# Patient Record
Sex: FEMALE | Race: WHITE | Employment: FULL TIME | URBAN - NONMETROPOLITAN AREA
[De-identification: names, ages, dates, MRNs, and addresses within clinical notes are randomized per-mention and may not be internally consistent; named-entity substitution may affect disease eponyms.]

---

## 2019-05-21 ENCOUNTER — PROCEDURE VISIT (OUTPATIENT)
Dept: NEUROLOGY | Age: 39
End: 2019-05-21
Payer: COMMERCIAL

## 2019-05-21 DIAGNOSIS — R20.0 BILATERAL HAND NUMBNESS: ICD-10-CM

## 2019-05-21 DIAGNOSIS — G56.03 BILATERAL CARPAL TUNNEL SYNDROME: Primary | ICD-10-CM

## 2019-05-21 PROCEDURE — 95886 MUSC TEST DONE W/N TEST COMP: CPT | Performed by: PSYCHIATRY & NEUROLOGY

## 2019-05-21 PROCEDURE — 95911 NRV CNDJ TEST 9-10 STUDIES: CPT | Performed by: PSYCHIATRY & NEUROLOGY

## 2020-11-24 ENCOUNTER — OFFICE VISIT (OUTPATIENT)
Dept: INTERNAL MEDICINE CLINIC | Age: 40
End: 2020-11-24
Payer: COMMERCIAL

## 2020-11-24 VITALS
SYSTOLIC BLOOD PRESSURE: 139 MMHG | DIASTOLIC BLOOD PRESSURE: 84 MMHG | BODY MASS INDEX: 21.5 KG/M2 | HEIGHT: 67 IN | HEART RATE: 83 BPM | WEIGHT: 137 LBS | TEMPERATURE: 97.8 F

## 2020-11-24 LAB
ALCOHOL URINE: ABNORMAL
AMPHETAMINE SCREEN, URINE: ABNORMAL
BARBITURATE SCREEN, URINE: ABNORMAL
BENZODIAZEPINE SCREEN, URINE: ABNORMAL
BUPRENORPHINE URINE: ABNORMAL
COCAINE METABOLITE SCREEN URINE: ABNORMAL
FENTANYL SCREEN, URINE: ABNORMAL
GABAPENTIN SCREEN, URINE: ABNORMAL
MDMA URINE: ABNORMAL
METHADONE SCREEN, URINE: ABNORMAL
METHAMPHETAMINE, URINE: ABNORMAL
OPIATE SCREEN URINE: ABNORMAL
OXYCODONE SCREEN URINE: ABNORMAL
PHENCYCLIDINE SCREEN URINE: ABNORMAL
PROPOXYPHENE SCREEN, URINE: ABNORMAL
SYNTHETIC CANNABINOIDS(K2) SCREEN, URINE: ABNORMAL
THC SCREEN, URINE: ABNORMAL
TRAMADOL SCREEN URINE: ABNORMAL
TRICYCLIC ANTIDEPRESSANTS, UR: ABNORMAL

## 2020-11-24 PROCEDURE — 99204 OFFICE O/P NEW MOD 45 MIN: CPT | Performed by: INTERNAL MEDICINE

## 2020-11-24 PROCEDURE — 80305 DRUG TEST PRSMV DIR OPT OBS: CPT | Performed by: INTERNAL MEDICINE

## 2020-11-24 RX ORDER — CITALOPRAM HYDROBROMIDE 20 MG/10ML
10 SOLUTION, ORAL ORAL DAILY
COMMUNITY
End: 2020-12-28

## 2020-11-24 RX ORDER — BUPRENORPHINE AND NALOXONE 12; 3 MG/1; MG/1
1 FILM, SOLUBLE BUCCAL; SUBLINGUAL DAILY
Qty: 4 FILM | Refills: 0 | Status: SHIPPED | OUTPATIENT
Start: 2020-11-24 | End: 2020-11-28

## 2020-11-24 SDOH — HEALTH STABILITY: MENTAL HEALTH: HOW OFTEN DO YOU HAVE A DRINK CONTAINING ALCOHOL?: MONTHLY OR LESS

## 2020-11-24 NOTE — PROGRESS NOTES
Verbal order per Dr. Lola Duarte for urine drug screen. Positive for BUP THC Alcohol. Verified results with Demi Raymundo RN. Dr. Lola Duarte ordered Suboxone 12mg film daily  for patient. Verified dose with patient. Patient was sent home with 4 day script of Suboxone 12mg film daily and will be seen back in the office 12/2/20.

## 2020-11-24 NOTE — PROGRESS NOTES
MEDICATION ASSISTED TREATMENT ENCOUNTER    HISTORY OF PRESENT ILLNESS  Patient presents for evaluation of opioid use and would like to be placed on medication assisted treatment  Patient was referred by a friend  She is on a Suboxone clinic on the other side of [place] has to pay $200 every 4 weeks  Patient has had problems using pain pills  Patient started using pain pills 10 years ago  A friend had given her a pain pill and she said it gave her energy  Patient uses it she usually would swallow them but at the end she was snorting them  Other drugs used: No  Suboxone programs in the past med to order in [address] [place] [address] in the past no  Last use of pain pills 7 years ago  Patient would typically use toward the end of a year she was using 10/5 to 10 mg Percocets  Ever used Suboxone off the street yes right before she quit pain pills she had used off the street and knew she wanted to try it    No past medical history on file. No past surgical history on file. PAST PSYCHIATRIC HISTORY: no    No Known Allergies    Current Outpatient Medications   Medication Sig Dispense Refill    citalopram (CELEXA) 10 MG/5ML solution Take 10 mg by mouth daily      buprenorphine-naloxone (SUBOXONE) 12-3 MG sublingual film Place 1 Film under the tongue daily for 4 days. 4 Film 0     No current facility-administered medications for this visit.           SOCIAL     Marital status      Children 2     Employment she works at [employer] issues no     Tobacco: yes, cigarettes     Alcohol occasional                 ROS     General: Patient denies fevers, chills ,weight changes, sweats     Psych: No depression, anxiety, suicidal ideation or attempts     Endocrine: No thyroid issues,no neck pain, no galactorrhea, no weight changes     Pulmonary: No shortness of breath, orthopnea, PND     Cardiac: No chest pain,syncope, no history of 11/24/2020  8:10 AM  Unknown    NEG    Oxycodone Screen, Ur  11/24/2020  8:10 AM  Unknown    NEG    PCP Screen, Urine  11/24/2020  8:10 AM  Unknown    NEG    Propoxyphene Screen, Urine  11/24/2020  8:10 AM  Unknown    N/A    Synthetic Cannabinoids (K2) Screen, Urine  11/24/2020  8:10 AM  Unknown    NEG    THC Screen, Urine  11/24/2020  8:10 AM  Unknown    POS    Tramadol Scrn, Ur  11/24/2020  8:10 AM  Unknown    NEG    Tricyclic Antidepressants, Urine  11/24/2020  8:10 AM  Unknown    N/A           Diagnosis Orders   1. Severe opioid use disorder (HCC)  POCT Rapid Drug Screen    buprenorphine-naloxone (SUBOXONE) 12-3 MG sublingual film   2. Encounter for monitoring Suboxone maintenance therapy     3. Polysubstance abuse [facility])           PLAN:  Provider provided education on Medication Assisted Treatment options, including: Suboxone, Sublocade, Methadone, and Naltrexone/Vivitrol  Allowed opportunity to respond to questions regarding the treatment options. Patient voices that their treatment preference is suboxone. I feel that this patient is an appropriate candidate for this treatment option. Education given on the importance of combining Medication Assisted Treatment with comprehensive treatment, including: individual counseling, treatment groups, community support groups, and psychiatry as applicable. Patient will meet with  to review clinic counseling expectations and to be linked to appropriate services. Provider reviewed medication contract with patient. Patient is agreeable to the program expectations. Both patient and provider signed the medication contract. Patient instructed to go to [facility] to watch a video and learn about [facility]. I told patient [facility] is an opioid antagonist that reverses respiratory depression caused by opioids. Pharmacy will give patient or family member [facility] and explain how to use in an emergency.   I reviewed the [place] Automated Rx Reporting System report     There does not appear to be any discrepancies or overprescribing of controlled substances    She said she eventually would like to try to wean off of Suboxone she has been on it so long  We will bring her down from 8 mg to 6 mg daily  Follow-up 8 days

## 2020-12-02 ENCOUNTER — OFFICE VISIT (OUTPATIENT)
Dept: INTERNAL MEDICINE CLINIC | Age: 40
End: 2020-12-02
Payer: COMMERCIAL

## 2020-12-02 VITALS
DIASTOLIC BLOOD PRESSURE: 60 MMHG | TEMPERATURE: 98.2 F | WEIGHT: 140 LBS | HEART RATE: 80 BPM | HEIGHT: 67 IN | SYSTOLIC BLOOD PRESSURE: 118 MMHG | BODY MASS INDEX: 21.97 KG/M2

## 2020-12-02 PROCEDURE — 80305 DRUG TEST PRSMV DIR OPT OBS: CPT | Performed by: INTERNAL MEDICINE

## 2020-12-02 PROCEDURE — 99213 OFFICE O/P EST LOW 20 MIN: CPT | Performed by: INTERNAL MEDICINE

## 2020-12-02 RX ORDER — ESCITALOPRAM OXALATE 20 MG/1
TABLET ORAL
COMMUNITY
Start: 2020-11-25

## 2020-12-02 RX ORDER — BUPRENORPHINE AND NALOXONE 4; 1 MG/1; MG/1
1 FILM, SOLUBLE BUCCAL; SUBLINGUAL DAILY
Qty: 5 FILM | Refills: 0 | Status: SHIPPED | OUTPATIENT
Start: 2020-12-02 | End: 2020-12-09 | Stop reason: SDUPTHER

## 2020-12-02 RX ORDER — BUPRENORPHINE AND NALOXONE 8; 2 MG/1; MG/1
FILM, SOLUBLE BUCCAL; SUBLINGUAL
COMMUNITY
Start: 2020-10-28 | End: 2020-12-09

## 2020-12-02 RX ORDER — BUPRENORPHINE AND NALOXONE 8; 2 MG/1; MG/1
1 FILM, SOLUBLE BUCCAL; SUBLINGUAL DAILY
Qty: 7 FILM | Refills: 0 | Status: CANCELLED | OUTPATIENT
Start: 2020-12-02 | End: 2020-12-09

## 2020-12-02 NOTE — PROGRESS NOTES
12/02/2020  8:00 AM  Unknown    NEG    Amphetamine Screen, Urine  12/02/2020  8:00 AM  Unknown    NEG    Barbiturate Screen, Urine  12/02/2020  8:00 AM  Unknown    NEG    Benzodiazepine Screen, Urine  12/02/2020  8:00 AM  Unknown    NEG    Buprenorphine Urine  12/02/2020  8:00 AM  Unknown    POS    Cocaine Metabolite Screen, Urine  12/02/2020  8:00 AM  Unknown    NEG    FENTANYL SCREEN, URINE  12/02/2020  8:00 AM  Unknown    NEG    Gabapentin Screen, Urine  12/02/2020  8:00 AM  Unknown    N/A    MDMA, Urine  12/02/2020  8:00 AM  Unknown    NEG    Methadone Screen, Urine  12/02/2020  8:00 AM  Unknown    NEG    Methamphetamine, Urine  12/02/2020  8:00 AM  Unknown    NEG    Opiate Scrn, Ur  12/02/2020  8:00 AM  Unknown    NEG    Oxycodone Screen, Ur  12/02/2020  8:00 AM  Unknown    NEG    PCP Screen, Urine  12/02/2020  8:00 AM  Unknown    NEG    Propoxyphene Screen, Urine  12/02/2020  8:00 AM  Unknown    N/A    Synthetic Cannabinoids (K2) Screen, Urine  12/02/2020  8:00 AM  Unknown    NEG    THC Screen, Urine  12/02/2020  8:00 AM  Unknown    POS    Tramadol Scrn, Ur  12/02/2020  8:00 AM  Unknown    NEG    Tricyclic Antidepressants, Urine  12/02/2020  8:00 AM  Unknown    N/A           Diagnosis Orders   1. Severe opioid use disorder (HCC)  POCT Rapid Drug Screen    buprenorphine-naloxone (SUBOXONE) 4-1 MG FILM SL film   2. Encounter for monitoring Suboxone maintenance therapy     3.  Polysubstance abuse ([facility] [address]           PLAN:  I told her the optimal dose of Suboxone is one in which she is having no withdrawal, no urges and cravings, no side effects from Suboxone  It sounds like she is having side effects  She has been taking a third of a 12 mg strip  I will a put her on 4 mg films daily  See her back in 1 week

## 2020-12-09 ENCOUNTER — OFFICE VISIT (OUTPATIENT)
Dept: INTERNAL MEDICINE CLINIC | Age: 40
End: 2020-12-09
Payer: COMMERCIAL

## 2020-12-09 VITALS
DIASTOLIC BLOOD PRESSURE: 65 MMHG | WEIGHT: 138 LBS | SYSTOLIC BLOOD PRESSURE: 134 MMHG | HEART RATE: 100 BPM | TEMPERATURE: 98.2 F | BODY MASS INDEX: 21.66 KG/M2 | HEIGHT: 67 IN

## 2020-12-09 PROCEDURE — 80305 DRUG TEST PRSMV DIR OPT OBS: CPT | Performed by: INTERNAL MEDICINE

## 2020-12-09 PROCEDURE — 99213 OFFICE O/P EST LOW 20 MIN: CPT | Performed by: INTERNAL MEDICINE

## 2020-12-09 RX ORDER — BUPRENORPHINE AND NALOXONE 4; 1 MG/1; MG/1
1 FILM, SOLUBLE BUCCAL; SUBLINGUAL DAILY
Qty: 13 FILM | Refills: 0 | Status: SHIPPED | OUTPATIENT
Start: 2020-12-09 | End: 2020-12-22

## 2020-12-09 NOTE — PROGRESS NOTES
MEDICATION ASSISTED TREATMENT ENCOUNTER    HISTORY OF PRESENT ILLNESS  Patient presents for evaluation of opioid use and would like to be placed on medication assisted treatment  I last saw her 12/2  I put her on Suboxone 12 mg daily  She said for the first few days she had a headache her eyes were puffy she did take some for a couple of days now she is taking a third of a 12 mg strip    Patient was referred by a friend  She is on a Suboxone clinic on the other side [facility] has to pay $200 every 4 weeks  Patient has had problems using pain pills  Patient started using pain pills 10 years ago  A friend had given her a pain pill and she said it gave her energy  Patient uses it she usually would swallow them but at the end she was snorting them  Other drugs used: No                ROS     General: As above was having headaches very tired feeling better in the last couple of days  PHYSICAL EXAM     Blood pressure 134/65, pulse 100, temperature 98.2 °F (36.8 °C), height 5' 7\" (1.702 m), weight 138 lb (62.6 kg).              General: Patient resting comfortably in no acute distress     Mental Status Examination:  Level of consciousness:  within normal limits and awake  Appearance:  well-appearing, in chair, good grooming and good hygiene  Behavior/Motor:  {Normal  Attitude toward examiner:  cooperative and attentive  Speech:  spontaneous and normal volume  Mood: normal  Affect:  appropriate  Thought processes:  linear  Thought content:  Denies homicidal ideations  Suicidal Ideation:  denies suicidal ideation  Delusions:  no evidence of delusions  Perceptual Disturbance:  denies any perceptual disturbance  Cognition:  oriented to person, place, and time    Insight : good  Judgment: good  Medication Side Effects:none       Eyes: Pupils are normal         Skin: No rashes, lesions or abnormalities noted        URINE DRUG SCREEN TODAY:  Alcohol, Urine

## 2020-12-23 RX ORDER — BUPRENORPHINE AND NALOXONE 4; 1 MG/1; MG/1
1 FILM, SOLUBLE BUCCAL; SUBLINGUAL DAILY
Qty: 6 FILM | Refills: 0 | OUTPATIENT
Start: 2020-12-23 | End: 2020-12-28 | Stop reason: SDUPTHER

## 2020-12-23 NOTE — TELEPHONE ENCOUNTER
VO per Dr. Vicenta Gallegos for Suboxone 4mg film daily for 6 days qty 6. LPN called in script of Suboxone 4mg film daily for 6 days qty 6 into [facility] in [facility].

## 2020-12-28 ENCOUNTER — OFFICE VISIT (OUTPATIENT)
Dept: INTERNAL MEDICINE CLINIC | Age: 40
End: 2020-12-28
Payer: COMMERCIAL

## 2020-12-28 VITALS
BODY MASS INDEX: 21.82 KG/M2 | SYSTOLIC BLOOD PRESSURE: 129 MMHG | DIASTOLIC BLOOD PRESSURE: 75 MMHG | HEART RATE: 94 BPM | WEIGHT: 139 LBS | HEIGHT: 67 IN | TEMPERATURE: 97.9 F

## 2020-12-28 PROCEDURE — 80305 DRUG TEST PRSMV DIR OPT OBS: CPT | Performed by: INTERNAL MEDICINE

## 2020-12-28 PROCEDURE — 99213 OFFICE O/P EST LOW 20 MIN: CPT | Performed by: INTERNAL MEDICINE

## 2020-12-28 RX ORDER — BUPRENORPHINE AND NALOXONE 4; 1 MG/1; MG/1
1 FILM, SOLUBLE BUCCAL; SUBLINGUAL DAILY
Qty: 7 FILM | Refills: 0 | Status: SHIPPED | OUTPATIENT
Start: 2020-12-28 | End: 2021-01-04 | Stop reason: SDUPTHER

## 2020-12-28 NOTE — PROGRESS NOTES
MEDICATION ASSISTED TREATMENT ENCOUNTER    HISTORY OF PRESENT ILLNESS  Patient presents for evaluation of opioid use and would like to be placed on medication assisted treatment  I last saw her 12/9  I put her on Suboxone 12 mg daily  She said for the first few days she had a headache her eyes were puffy she did take some for a couple of days now she is taking a third of a 12 mg strip    Patient was referred by a friend  She is on a Suboxone clinic on the other side of [place] has to pay $200 every 4 weeks  Patient has had problems using pain pills  Patient started using pain pills 10 years ago  A friend had given her a pain pill and she said it gave her energy  Patient uses it she usually would swallow them but at the end she was snorting them  Other drugs used: No                ROS     General:Patient is feeling well  Patient is not experiencing  withdrawal symptoms ,no urges or cravings  Patient is not having any side effects from the buprenorphine    PHYSICAL EXAM     Blood pressure 129/75, pulse 94, temperature 97.9 °F (36.6 °C), height 5' 7\" (1.702 m), weight 139 lb (63 kg).              General: Patient resting comfortably in no acute distress     Mental Status Examination:  Level of consciousness:  within normal limits and awake  Appearance:  well-appearing, in chair, good grooming and good hygiene  Behavior/Motor:  {Normal  Attitude toward examiner:  cooperative and attentive  Speech:  spontaneous and normal volume  Mood: normal  Affect:  appropriate  Thought processes:  linear  Thought content:  Denies homicidal ideations  Suicidal Ideation:  denies suicidal ideation  Delusions:  no evidence of delusions  Perceptual Disturbance:  denies any perceptual disturbance  Cognition:  oriented to person, place, and time    Insight : good  Judgment: good  Medication Side Effects:none       Eyes: Pupils are normal         Skin: No rashes, lesions or abnormalities noted        URINE DRUG SCREEN TODAY:  Alcohol, Urine 12/28/2020 11:10 AM Unknown   NEG    Amphetamine Screen, Urine 12/28/2020 11:10 AM Unknown   NEG    Barbiturate Screen, Urine 12/28/2020 11:10 AM Unknown   NEG    Benzodiazepine Screen, Urine 12/28/2020 11:10 AM Unknown   NEG    Buprenorphine Urine 12/28/2020 11:10 AM Unknown   POS    Cocaine Metabolite Screen, Urine 12/28/2020 11:10 AM Unknown   NEG    FENTANYL SCREEN, URINE 12/28/2020 11:10 AM Unknown   NEG    Gabapentin Screen, Urine 12/28/2020 11:10 AM Unknown   N/A    MDMA, Urine 12/28/2020 11:10 AM Unknown   NEG    Methadone Screen, Urine 12/28/2020 11:10 AM Unknown   NEG    Methamphetamine, Urine 12/28/2020 11:10 AM Unknown   NEG    Opiate Scrn, Ur 12/28/2020 11:10 AM Unknown   NEG    Oxycodone Screen, Ur 12/28/2020 11:10 AM Unknown   NEG    PCP Screen, Urine 12/28/2020 11:10 AM Unknown   NEG    Propoxyphene Screen, Urine 12/28/2020 11:10 AM Unknown   N/A    Synthetic Cannabinoids (K2) Screen, Urine 12/28/2020 11:10 AM Unknown   NEG    THC Screen, Urine 12/28/2020 11:10 AM Unknown   POS    Tramadol Scrn, Ur 12/28/2020 11:10 AM Unknown   NEG    Tricyclic Antidepressants, Urine 12/28/2020 11:10 AM Unknown   N/A         Diagnosis Orders   1. Severe opioid use disorder (HCC)  POCT Rapid Drug Screen    buprenorphine-naloxone (SUBOXONE) 4-1 MG FILM SL film   2. Encounter for monitoring Suboxone maintenance therapy     3.  Polysubstance abuse ([facility] [address]            PLAN:  Patient is on 4 mg Suboxone daily and doing well  I reviewed the [place] Automated Rx Reporting System report     There does not appear to be any discrepancies or overprescribing of controlled substances  Follow-up 1 week

## 2021-01-04 ENCOUNTER — OFFICE VISIT (OUTPATIENT)
Dept: INTERNAL MEDICINE CLINIC | Age: 41
End: 2021-01-04
Payer: COMMERCIAL

## 2021-01-04 VITALS
HEIGHT: 67 IN | DIASTOLIC BLOOD PRESSURE: 60 MMHG | HEART RATE: 88 BPM | SYSTOLIC BLOOD PRESSURE: 120 MMHG | WEIGHT: 142 LBS | BODY MASS INDEX: 22.29 KG/M2 | TEMPERATURE: 97.5 F

## 2021-01-04 DIAGNOSIS — Z51.81 ENCOUNTER FOR MONITORING SUBOXONE MAINTENANCE THERAPY: ICD-10-CM

## 2021-01-04 DIAGNOSIS — Z79.899 ENCOUNTER FOR MONITORING SUBOXONE MAINTENANCE THERAPY: ICD-10-CM

## 2021-01-04 DIAGNOSIS — F11.20 SEVERE OPIOID USE DISORDER (HCC): Primary | ICD-10-CM

## 2021-01-04 DIAGNOSIS — F19.10 POLYSUBSTANCE ABUSE (HCC): ICD-10-CM

## 2021-01-04 PROCEDURE — 80305 DRUG TEST PRSMV DIR OPT OBS: CPT | Performed by: INTERNAL MEDICINE

## 2021-01-04 PROCEDURE — 99213 OFFICE O/P EST LOW 20 MIN: CPT | Performed by: INTERNAL MEDICINE

## 2021-01-04 RX ORDER — BUPRENORPHINE AND NALOXONE 4; 1 MG/1; MG/1
1 FILM, SOLUBLE BUCCAL; SUBLINGUAL DAILY
Qty: 14 FILM | Refills: 0 | Status: SHIPPED | OUTPATIENT
Start: 2021-01-04 | End: 2021-01-18

## 2021-01-04 ASSESSMENT — ENCOUNTER SYMPTOMS: EYES NEGATIVE: 1

## 2021-01-04 NOTE — PROGRESS NOTES
Verbal order per Dr. Lakshmi Valdez for urine drug screen. Positive for BUP Alcohol. Verified results with Anu Chauhan RN. Dr. Lakshmi Valdez ordered Suboxone 4mg film daily for patient. Verified dose with patient. Patient was sent home with 2 week script of Suboxone 4mg film daily and will be seen back in the office 1/19/21.

## 2021-01-04 NOTE — PROGRESS NOTES
Vi Gallagher (:  1980) is a 38 y.o. female,Established patient, here for evaluation of the following chief complaint(s):  Drug Problem      ASSESSMENT/PLAN:  1. Severe opioid use disorder (HCC)  -     POCT Rapid Drug Screen  -     buprenorphine-naloxone (SUBOXONE) 4-1 MG FILM SL film; Place 1 Film under the tongue daily for 14 days. , Disp-14 Film, R-0Normal  2. Encounter for monitoring Suboxone maintenance therapy  3. Polysubstance abuse ([address]      Return in about 2 weeks (around 2021). SUBJECTIVE/OBJECTIVE:  HPI  I last saw her   I put her on Suboxone 12 mg daily    She said for the first few days she had a headache her eyes were puffy she did take some for a couple of days now she is taking a third of a 12 mg strip    Patient was referred by a friend  She is on a Suboxone clinic on the other side [facility] has to pay $200 every 4 weeks  Patient has had problems using pain pills  Patient started using pain pills 10 years ago  A friend had given her a pain pill and she said it gave her energy  Patient uses it she usually would swallow them but at the end she was snorting them  Other drugs used: No  Review of Systems   Constitutional: Negative. Eyes: Negative. Neurological: Negative. Physical Exam  Constitutional:       Appearance: She is normal weight. Eyes:      Extraocular Movements: Extraocular movements intact. Pupils: Pupils are equal, round, and reactive to light. Skin:     General: Skin is warm. Neurological:      General: No focal deficit present. Mental Status: She is alert.          Urine drug screen today  Alcohol, Urine 2021  1:11 PM Unknown   POS    Amphetamine Screen, Urine 2021  1:11 PM Unknown   NEG    Barbiturate Screen, Urine 2021  1:11 PM Unknown   NEG    Benzodiazepine Screen, Urine 2021  1:11 PM Unknown   NEG    Buprenorphine Urine 2021  1:11 PM Unknown   POS    Cocaine Metabolite Screen, Urine 2021  1:11

## 2021-01-19 ENCOUNTER — OFFICE VISIT (OUTPATIENT)
Dept: INTERNAL MEDICINE CLINIC | Age: 41
End: 2021-01-19
Payer: COMMERCIAL

## 2021-01-19 VITALS
WEIGHT: 142 LBS | SYSTOLIC BLOOD PRESSURE: 121 MMHG | BODY MASS INDEX: 22.29 KG/M2 | HEIGHT: 67 IN | DIASTOLIC BLOOD PRESSURE: 85 MMHG | HEART RATE: 85 BPM

## 2021-01-19 DIAGNOSIS — F11.20 SEVERE OPIOID USE DISORDER (HCC): Primary | ICD-10-CM

## 2021-01-19 DIAGNOSIS — Z79.899 ENCOUNTER FOR MONITORING SUBOXONE MAINTENANCE THERAPY: ICD-10-CM

## 2021-01-19 DIAGNOSIS — Z51.81 ENCOUNTER FOR MONITORING SUBOXONE MAINTENANCE THERAPY: ICD-10-CM

## 2021-01-19 DIAGNOSIS — F19.10 POLYSUBSTANCE ABUSE (HCC): ICD-10-CM

## 2021-01-19 PROCEDURE — 80305 DRUG TEST PRSMV DIR OPT OBS: CPT | Performed by: INTERNAL MEDICINE

## 2021-01-19 PROCEDURE — 99213 OFFICE O/P EST LOW 20 MIN: CPT | Performed by: INTERNAL MEDICINE

## 2021-01-19 RX ORDER — BUPRENORPHINE AND NALOXONE 2; .5 MG/1; MG/1
1 FILM, SOLUBLE BUCCAL; SUBLINGUAL DAILY
Qty: 7 FILM | Refills: 0 | Status: SHIPPED | OUTPATIENT
Start: 2021-01-19 | End: 2021-01-26 | Stop reason: SDUPTHER

## 2021-01-19 RX ORDER — BUPRENORPHINE AND NALOXONE 4; 1 MG/1; MG/1
1 FILM, SOLUBLE BUCCAL; SUBLINGUAL DAILY
COMMUNITY
End: 2021-07-14 | Stop reason: SDUPTHER

## 2021-01-19 RX ORDER — BUPRENORPHINE AND NALOXONE 4; 1 MG/1; MG/1
1 FILM, SOLUBLE BUCCAL; SUBLINGUAL DAILY
Qty: 7 FILM | Refills: 0 | Status: CANCELLED | OUTPATIENT
Start: 2021-01-19 | End: 2021-01-26

## 2021-01-19 ASSESSMENT — ENCOUNTER SYMPTOMS: EYES NEGATIVE: 1

## 2021-01-19 NOTE — PROGRESS NOTES
Verbal order per Dr. Jero Medrano for urine drug screen. Positive for BUP, ETOH, THC. Verified results with Elaine Gaytan LPN. Dr. Jero Medrano ordered Suboxone 2 mg film daily for patient. Verified dose with patient. Patient was sent home with 1 week script for Suboxone 2 mg film daily and will be seen back in the office on 1/26/21.

## 2021-01-19 NOTE — PROGRESS NOTES
Kenna Patel (:  1980) is a 38 y.o. female,Established patient, here for evaluation of the following chief complaint(s):  Drug Problem      ASSESSMENT/PLAN:  1. Severe opioid use disorder (HCC)  -     POCT Rapid Drug Screen  -     buprenorphine-naloxone (SUBOXONE) 2-0.5 MG FILM SL film; Place 1 Film under the tongue daily for 7 days. , Disp-7 Film, R-0Normal  2. Encounter for monitoring Suboxone maintenance therapy  3. Polysubstance abuse ([facility] [address]      I am going to lower her dose of Suboxone to 2 mg daily  Follow-up here in 1 week  She is not having any urges or cravings  SUBJECTIVE/OBJECTIVE:  HPI  I last saw her   I put her on Suboxone 12 mg daily    She said for the first few days she had a headache her eyes were puffy she did take some for a couple of days now she is taking a third of a 12 mg strip    Patient was referred by a friend  She is on a Suboxone clinic on the other side [facility] has to pay $200 every 4 weeks  Patient has had problems using pain pills  Patient started using pain pills 10 years ago  A friend had given her a pain pill and she said it gave her energy  Patient uses it she usually would swallow them but at the end she was snorting them  Other drugs used: No  She has been on Suboxone about 7 years is considering getting off of it  Review of Systems   Constitutional: Negative. Eyes: Negative. Neurological: Negative. Physical Exam  Constitutional:       Appearance: She is normal weight. Eyes:      Extraocular Movements: Extraocular movements intact. Pupils: Pupils are equal, round, and reactive to light. Skin:     General: Skin is warm. Neurological:      General: No focal deficit present. Mental Status: She is alert.          Urine drug screen today  Alcohol, Urine 2021  8:14 AM Unknown   POS    Amphetamine Screen, Urine 2021  8:14 AM Unknown   NEG    Barbiturate Screen, Urine 2021  8:14 AM Unknown   NEG    Benzodiazepine Screen, Urine 01/19/2021  8:14 AM Unknown   NEG    Buprenorphine Urine 01/19/2021  8:14 AM Unknown   POS    Cocaine Metabolite Screen, Urine 01/19/2021  8:14 AM Unknown   NEG    FENTANYL SCREEN, URINE 01/19/2021  8:14 AM Unknown   NEG    Gabapentin Screen, Urine 01/19/2021  8:14 AM Unknown   N/A    MDMA, Urine 01/19/2021  8:14 AM Unknown   NEG    Methadone Screen, Urine 01/19/2021  8:14 AM Unknown   NEG    Methamphetamine, Urine 01/19/2021  8:14 AM Unknown   NEG    Opiate Scrn, Ur 01/19/2021  8:14 AM Unknown   NEG    Oxycodone Screen, Ur 01/19/2021  8:14 AM Unknown   NEG    PCP Screen, Urine 01/19/2021  8:14 AM Unknown   NEG    Propoxyphene Screen, Urine 01/19/2021  8:14 AM Unknown   N/A    Synthetic Cannabinoids (K2) Screen, Urine 01/19/2021  8:14 AM Unknown   NEG    THC Screen, Urine 01/19/2021  8:14 AM Unknown   POS    Tramadol Scrn, Ur 01/19/2021  8:14 AM Unknown   NEG    Tricyclic Antidepressants, Urine 01/19/2021  8:14 AM Unknown   N/A          I reviewed the [place] Automated Rx Reporting System report     There does not appear to be any discrepancies or overprescribing of controlled substances        An electronic signature was used to authenticate this note.     --Kenia Herrera MD

## 2021-01-26 ENCOUNTER — OFFICE VISIT (OUTPATIENT)
Dept: INTERNAL MEDICINE CLINIC | Age: 41
End: 2021-01-26
Payer: COMMERCIAL

## 2021-01-26 ENCOUNTER — TELEPHONE (OUTPATIENT)
Dept: INTERNAL MEDICINE CLINIC | Age: 41
End: 2021-01-26

## 2021-01-26 ENCOUNTER — VIRTUAL VISIT (OUTPATIENT)
Dept: INTERNAL MEDICINE CLINIC | Age: 41
End: 2021-01-26
Payer: COMMERCIAL

## 2021-01-26 VITALS
BODY MASS INDEX: 21.82 KG/M2 | SYSTOLIC BLOOD PRESSURE: 94 MMHG | HEART RATE: 91 BPM | DIASTOLIC BLOOD PRESSURE: 60 MMHG | WEIGHT: 139 LBS | HEIGHT: 67 IN

## 2021-01-26 DIAGNOSIS — F11.20 SEVERE OPIOID USE DISORDER (HCC): ICD-10-CM

## 2021-01-26 DIAGNOSIS — F19.10 POLYSUBSTANCE ABUSE (HCC): ICD-10-CM

## 2021-01-26 DIAGNOSIS — Z79.899 ENCOUNTER FOR MONITORING SUBOXONE MAINTENANCE THERAPY: ICD-10-CM

## 2021-01-26 DIAGNOSIS — Z78.9 ALCOHOL USE: ICD-10-CM

## 2021-01-26 DIAGNOSIS — Z51.81 ENCOUNTER FOR MONITORING SUBOXONE MAINTENANCE THERAPY: ICD-10-CM

## 2021-01-26 DIAGNOSIS — F11.20 SEVERE OPIOID USE DISORDER (HCC): Primary | ICD-10-CM

## 2021-01-26 PROCEDURE — 99213 OFFICE O/P EST LOW 20 MIN: CPT | Performed by: INTERNAL MEDICINE

## 2021-01-26 PROCEDURE — 99024 POSTOP FOLLOW-UP VISIT: CPT | Performed by: INTERNAL MEDICINE

## 2021-01-26 PROCEDURE — 80305 DRUG TEST PRSMV DIR OPT OBS: CPT | Performed by: INTERNAL MEDICINE

## 2021-01-26 RX ORDER — BUPRENORPHINE AND NALOXONE 2; .5 MG/1; MG/1
1 FILM, SOLUBLE BUCCAL; SUBLINGUAL DAILY
Qty: 14 FILM | Refills: 0 | Status: SHIPPED | OUTPATIENT
Start: 2021-01-26 | End: 2021-02-09

## 2021-01-26 RX ORDER — BUPRENORPHINE AND NALOXONE 2; .5 MG/1; MG/1
1 FILM, SOLUBLE BUCCAL; SUBLINGUAL DAILY
Qty: 14 FILM | Refills: 0 | Status: CANCELLED | OUTPATIENT
Start: 2021-01-26 | End: 2021-02-09

## 2021-01-26 ASSESSMENT — ENCOUNTER SYMPTOMS: EYES NEGATIVE: 1

## 2021-01-26 NOTE — TELEPHONE ENCOUNTER
[employer] # [phone] requesting to talk to Dr. Solange Jacob nurse about patient's RX. I gave request to Dr. Simran Teresa.

## 2021-02-10 ENCOUNTER — OFFICE VISIT (OUTPATIENT)
Dept: INTERNAL MEDICINE CLINIC | Age: 41
End: 2021-02-10
Payer: COMMERCIAL

## 2021-02-10 VITALS
BODY MASS INDEX: 21.82 KG/M2 | SYSTOLIC BLOOD PRESSURE: 129 MMHG | HEIGHT: 67 IN | TEMPERATURE: 97.5 F | WEIGHT: 139 LBS | DIASTOLIC BLOOD PRESSURE: 85 MMHG | HEART RATE: 85 BPM

## 2021-02-10 DIAGNOSIS — F11.20 SEVERE OPIOID USE DISORDER (HCC): Primary | ICD-10-CM

## 2021-02-10 DIAGNOSIS — Z79.899 ENCOUNTER FOR MONITORING SUBOXONE MAINTENANCE THERAPY: ICD-10-CM

## 2021-02-10 DIAGNOSIS — F19.10 POLYSUBSTANCE ABUSE (HCC): ICD-10-CM

## 2021-02-10 DIAGNOSIS — Z78.9 ALCOHOL USE: ICD-10-CM

## 2021-02-10 DIAGNOSIS — Z51.81 ENCOUNTER FOR MONITORING SUBOXONE MAINTENANCE THERAPY: ICD-10-CM

## 2021-02-10 PROCEDURE — 80305 DRUG TEST PRSMV DIR OPT OBS: CPT | Performed by: INTERNAL MEDICINE

## 2021-02-10 PROCEDURE — 99213 OFFICE O/P EST LOW 20 MIN: CPT | Performed by: INTERNAL MEDICINE

## 2021-02-10 RX ORDER — BUPRENORPHINE AND NALOXONE 2; .5 MG/1; MG/1
1 FILM, SOLUBLE BUCCAL; SUBLINGUAL
Qty: 7 FILM | Refills: 0 | Status: SHIPPED | OUTPATIENT
Start: 2021-02-10 | End: 2021-02-17

## 2021-02-10 RX ORDER — BUPRENORPHINE AND NALOXONE 2; .5 MG/1; MG/1
1 FILM, SOLUBLE BUCCAL; SUBLINGUAL DAILY
Qty: 14 FILM | Refills: 0 | Status: CANCELLED | OUTPATIENT
Start: 2021-02-10 | End: 2021-02-24

## 2021-02-10 ASSESSMENT — ENCOUNTER SYMPTOMS: EYES NEGATIVE: 1

## 2021-02-10 NOTE — PROGRESS NOTES
Eloina Bates (:  1980) is a 38 y.o. female,Established patient, here for evaluation of the following chief complaint(s):  Drug Problem      ASSESSMENT/PLAN:  1. Severe opioid use disorder (HCC)  -     POCT Rapid Drug Screen  -     buprenorphine-naloxone (SUBOXONE) 2-0.5 MG FILM SL film; Place 1 Film under the tongue every 48 hours for 7 days. , Disp-7 Film, R-0Normal  2. Encounter for monitoring Suboxone maintenance therapy  3. Polysubstance abuse ([facility] [address]  4. Alcohol use    Plan will be to have her take Suboxone 2 mg every other day  We will do a virtual visit in 1 week  Patient is sure that she wants to get off of Suboxone  SUBJECTIVE/OBJECTIVE:  Drug Problem      I last saw her   She is been on Suboxone for about 8 years  We are weaning her down  She said she is taking 2 mg a day and doing well      Patient was referred by a friend  She is on a Suboxone clinic on the other side [facility] has to pay $200 every 4 weeks  Patient has had problems using pain pills  Patient started using pain pills 10 years ago  A friend had given her a pain pill and she said it gave her energy  Patient uses it she usually would swallow them but at the end she was snorting them  Other drugs used: No    Review of Systems   Constitutional: Negative. Eyes: Negative. Neurological: Negative. Physical Exam  Constitutional:       Appearance: She is normal weight. Eyes:      Extraocular Movements: Extraocular movements intact. Pupils: Pupils are equal, round, and reactive to light. Skin:     General: Skin is warm. Neurological:      General: No focal deficit present. Mental Status: She is alert.          Urine drug screen today  Alcohol, Urine 02/10/2021  8:15 AM Unknown   POS    Amphetamine Screen, Urine 02/10/2021  8:15 AM Unknown   NEG    Barbiturate Screen, Urine 02/10/2021  8:15 AM Unknown   NEG    Benzodiazepine Screen, Urine 02/10/2021  8:15 AM Unknown   NEG    Buprenorphine Urine 02/10/2021  8:15 AM Unknown   POS    Cocaine Metabolite Screen, Urine 02/10/2021  8:15 AM Unknown   NEG    FENTANYL SCREEN, URINE 02/10/2021  8:15 AM Unknown   NEG    Gabapentin Screen, Urine 02/10/2021  8:15 AM Unknown   N/A    MDMA, Urine 02/10/2021  8:15 AM Unknown   NEG    Methadone Screen, Urine 02/10/2021  8:15 AM Unknown   NEG    Methamphetamine, Urine 02/10/2021  8:15 AM Unknown   NEG    Opiate Scrn, Ur 02/10/2021  8:15 AM Unknown   NEG    Oxycodone Screen, Ur 02/10/2021  8:15 AM Unknown   NEG    PCP Screen, Urine 02/10/2021  8:15 AM Unknown   NEG    Propoxyphene Screen, Urine 02/10/2021  8:15 AM Unknown   N/A    Synthetic Cannabinoids (K2) Screen, Urine 02/10/2021  8:15 AM Unknown   NEG    THC Screen, Urine 02/10/2021  8:15 AM Unknown   NEG    Tramadol Scrn, Ur 02/10/2021  8:15 AM Unknown   NEG    Tricyclic Antidepressants, Urine 02/10/2021  8:15 AM Unknown   N/A        I reviewed the [place] Automated Rx Reporting System report     There does not appear to be any discrepancies or overprescribing of controlled substances        An electronic signature was used to authenticate this note.     --Aj Castro MD

## 2021-02-10 NOTE — PROGRESS NOTES
Verbal order per Dr. Rachel Bobo for urine drug screen. Positive for BUP Alcohol. Verified results with Bard Angela ROSARIO. Dr. Rachel Bobo ordered Suboxone 2mg film every 48hrs for patient. Verified dose with patient. Patient was sent home with 1 week script of Suboxone 2mg film every 48hrs  and will be seen back in the office 2/17/21.

## 2021-02-17 ENCOUNTER — VIRTUAL VISIT (OUTPATIENT)
Dept: INTERNAL MEDICINE CLINIC | Age: 41
End: 2021-02-17

## 2021-02-17 DIAGNOSIS — F11.20 SEVERE OPIOID USE DISORDER (HCC): ICD-10-CM

## 2021-02-17 PROCEDURE — 99024 POSTOP FOLLOW-UP VISIT: CPT | Performed by: INTERNAL MEDICINE

## 2021-02-17 RX ORDER — BUPRENORPHINE AND NALOXONE 2; .5 MG/1; MG/1
1 FILM, SOLUBLE BUCCAL; SUBLINGUAL
Qty: 7 FILM | Refills: 0 | Status: CANCELLED | OUTPATIENT
Start: 2021-02-17 | End: 2021-02-24

## 2021-02-24 ENCOUNTER — VIRTUAL VISIT (OUTPATIENT)
Dept: INTERNAL MEDICINE CLINIC | Age: 41
End: 2021-02-24
Payer: COMMERCIAL

## 2021-02-24 DIAGNOSIS — Z51.81 ENCOUNTER FOR MONITORING SUBOXONE MAINTENANCE THERAPY: ICD-10-CM

## 2021-02-24 DIAGNOSIS — Z79.899 ENCOUNTER FOR MONITORING SUBOXONE MAINTENANCE THERAPY: ICD-10-CM

## 2021-02-24 DIAGNOSIS — F19.10 POLYSUBSTANCE ABUSE (HCC): ICD-10-CM

## 2021-02-24 DIAGNOSIS — F11.20 SEVERE OPIOID USE DISORDER (HCC): Primary | ICD-10-CM

## 2021-02-24 PROCEDURE — 99213 OFFICE O/P EST LOW 20 MIN: CPT | Performed by: INTERNAL MEDICINE

## 2021-02-24 RX ORDER — BUPRENORPHINE AND NALOXONE 2; .5 MG/1; MG/1
0.5 FILM, SOLUBLE BUCCAL; SUBLINGUAL DAILY
Qty: 7 FILM | Refills: 0 | Status: SHIPPED | OUTPATIENT
Start: 2021-02-24 | End: 2021-03-10

## 2021-02-24 RX ORDER — BUPRENORPHINE AND NALOXONE 4; 1 MG/1; MG/1
1 FILM, SOLUBLE BUCCAL; SUBLINGUAL
Qty: 7 FILM | Refills: 0 | Status: CANCELLED | OUTPATIENT
Start: 2021-02-24 | End: 2021-03-10

## 2021-02-24 NOTE — PROGRESS NOTES
2021    TELEHEALTH EVALUATION -- Audio/Visual (During PQNYW-41 public health emergency)    HPI:  A video conference was done with the patient  Patient was at home, I was in the office  There was no  one else  present during the conference    Betty Riley (:  1980) has requested an audio/video evaluation for the following concern(s):    HPI  I last saw her here 2/10       She is been on Suboxone for about 8 years  We are weaning her down  She said she is taking 2 mg every other day      Patient was referred by a friend  She is on a Suboxone clinic on the other side of [facility] has to pay $200 every 4 weeks  Patient has had problems using pain pills  Patient started using pain pills 10 years ago  A friend had given her a pain pill and she said it gave her energy  Patient uses it she usually would swallow them but at the end she was snorting them  ROS-Patient is feeling well  Patient is not experiencing  withdrawal symptoms ,no urges or cravings  Patient is not having any side effects from the buprenorphine      Prior to Visit Medications    Medication Sig Taking? Authorizing Provider   buprenorphine-naloxone (SUBOXONE) 2-0.5 MG FILM SL film Place 0.5 Film under the tongue daily for 14 days. Yes Florin Ceron MD   buprenorphine-naloxone (SUBOXONE) 4-1 MG FILM SL film Place 1 Film under the tongue daily. Yes Historical Provider, MD   escitalopram (LEXAPRO) 20 MG tablet take 1 tablet by mouth once daily  Historical Provider, MD       Social History     Tobacco Use    Smoking status: Current Every Day Smoker     Packs/day: 0.25    Smokeless tobacco: Never Used   Substance Use Topics    Alcohol use:  Yes     Alcohol/week: 2.0 standard drinks     Types: 2 Shots of liquor per week     Frequency: Monthly or less    Drug use: Yes     Types: Marijuana, Opiates      Comment: last used percocet and vicodin             PHYSICAL EXAMINATION: [ INSTRUCTIONS:  \"[x]\" Indicates a positive item  \"[]\" Indicates a negative item  -- DELETE ALL ITEMS NOT EXAMINED]  No vitals done    Constitutional: [x] Appears well-developed and well-nourished [x] No apparent distress      [] Abnormal-   Mental status  [x] Alert and awake  [x] Oriented to person/place/time [x]Able to follow commands      Eyes:  EOM    [x]  Normal  [] Abnormal-  Sclera  []  Normal  [] Abnormal -         Discharge []  None visible  [] Abnormal -  Pupils normal           Psychiatric:       [x] Normal Affect [] No Hallucinations        [] Abnormal-        Diagnosis Orders   1. Severe opioid use disorder (HCC)  buprenorphine-naloxone (SUBOXONE) 2-0.5 MG FILM SL film   2. Encounter for monitoring Suboxone maintenance therapy     3. Polysubstance abuse ([facility] [address]           Farrah Velazquez is a 42 y.o. female being evaluated by a Virtual Visit (video visit) encounter to address concerns as mentioned above. A caregiver was present when appropriate. Due to this being a TeleHealth encounter (During EXUFE-02 public health emergency), evaluation of the following organ systems was limited: Vitals/Constitutional/EENT/Resp/CV/GI//MS/Neuro/Skin/Heme-Lymph-Imm. Pursuant to the emergency declaration under the [address] [place] [address], [address] [place] [address] and the [employer] and [employer] Act, this Virtual Visit was conducted with patient's (and/or legal guardian's) consent, to reduce the patient's risk of exposure to COVID-19 and provide necessary medical care. The patient (and/or legal guardian) has also been advised to contact this office for worsening conditions or problems, and seek emergency medical treatment and/or call 911 if deemed necessary. Services were provided through a video synchronous discussion virtually to substitute for in-person clinic visit.     I am going to have her take 1 mg Suboxone every day  Follow-up 14 days If she does well I think we can discontinue the Suboxone at that point  --Jameson Bowen MD on 2/26/2021 at 1:49 PM    An electronic signature was used to authenticate this note.

## 2021-06-15 ENCOUNTER — OFFICE VISIT (OUTPATIENT)
Dept: INTERNAL MEDICINE CLINIC | Age: 41
End: 2021-06-15
Payer: COMMERCIAL

## 2021-06-15 ENCOUNTER — TELEPHONE (OUTPATIENT)
Dept: INTERNAL MEDICINE CLINIC | Age: 41
End: 2021-06-15

## 2021-06-15 VITALS
TEMPERATURE: 97.8 F | HEART RATE: 75 BPM | DIASTOLIC BLOOD PRESSURE: 112 MMHG | HEIGHT: 67 IN | SYSTOLIC BLOOD PRESSURE: 167 MMHG | BODY MASS INDEX: 21.5 KG/M2 | WEIGHT: 137 LBS

## 2021-06-15 DIAGNOSIS — Z79.899 ENCOUNTER FOR MONITORING SUBOXONE MAINTENANCE THERAPY: ICD-10-CM

## 2021-06-15 DIAGNOSIS — Z78.9 ALCOHOL USE: ICD-10-CM

## 2021-06-15 DIAGNOSIS — F19.10 POLYSUBSTANCE ABUSE (HCC): ICD-10-CM

## 2021-06-15 DIAGNOSIS — Z51.81 ENCOUNTER FOR MONITORING SUBOXONE MAINTENANCE THERAPY: ICD-10-CM

## 2021-06-15 DIAGNOSIS — F11.20 SEVERE OPIOID USE DISORDER (HCC): Primary | ICD-10-CM

## 2021-06-15 PROCEDURE — 80305 DRUG TEST PRSMV DIR OPT OBS: CPT | Performed by: INTERNAL MEDICINE

## 2021-06-15 PROCEDURE — 99213 OFFICE O/P EST LOW 20 MIN: CPT | Performed by: INTERNAL MEDICINE

## 2021-06-15 RX ORDER — BUPRENORPHINE AND NALOXONE 4; 1 MG/1; MG/1
1 FILM, SOLUBLE BUCCAL; SUBLINGUAL DAILY
Qty: 8 FILM | Refills: 0 | Status: SHIPPED | OUTPATIENT
Start: 2021-06-15 | End: 2021-06-22 | Stop reason: SDUPTHER

## 2021-06-15 ASSESSMENT — ENCOUNTER SYMPTOMS: EYES NEGATIVE: 1

## 2021-06-15 NOTE — PROGRESS NOTES
Verbal order per Dr. Gibson Giordano for urine drug screen. Positive for BUP THC Alcohol. Verified results with Nirali Field RN. Dr. Gibson Giordano ordered Suboxone 4mg film daily for patient. Verified dose with patient. Patient was sent home with 8 day script of Suboxone 4mg film daily and will be seen back in the office 6/22/21.

## 2021-06-15 NOTE — TELEPHONE ENCOUNTER
LPN received call from patient stating she was a previous patient and thinks she was tapered off Suboxone too quickly and would like to make appointment.

## 2021-06-15 NOTE — PROGRESS NOTES
Alisson Ramirez (:  1980) is a 42 y.o. female,Established patient, here for evaluation of the following chief complaint(s):  Drug Problem      ASSESSMENT/PLAN:  1. Severe opioid use disorder (HCC)  -     POCT Rapid Drug Screen  2. Encounter for monitoring Suboxone maintenance therapy  3. Polysubstance abuse ([facility] [address]  4. Alcohol use    Plan will be to have her take Suboxone 2 mg every other day  We will do a virtual visit in 1 week  Patient is sure that she wants to get off of Suboxone  SUBJECTIVE/OBJECTIVE:  Drug Problem      I last saw her February 10 we had 2 other visits that were virtual after that in February  We weaned her off of Suboxone from November to February  She got down to 1 mg and then got off in February  She had been on Suboxone for about 8 years  She says she did not relapse but she has been getting Suboxone from a friend since around March beginning  Last Suboxone was 2 days ago  She thinks she needs to get back on a low-dose   Patient was referred by a friend  She is on a Suboxone clinic on the other side [facility] has to pay $200 every 4 weeks  Patient has had problems using pain pills  Patient started using pain pills 10 years ago  A friend had given her a pain pill and she said it gave her energy  Patient uses it she usually would swallow them but at the end she was snorting them  Other drugs used: No    Review of Systems   Constitutional: Negative. Eyes: Negative. Neurological: Negative. Physical Exam  Constitutional:       Appearance: She is normal weight. Eyes:      Extraocular Movements: Extraocular movements intact. Pupils: Pupils are equal, round, and reactive to light. Skin:     General: Skin is warm. Neurological:      General: No focal deficit present. Mental Status: She is alert.        Social   with a boyfriend  2 children  Patient works at [employer] or [employer]  Smokes cigarettes  Social drinker  Does use THC  Urine drug screen today  Alcohol, Urine 06/15/2021  2:17 PM Unknown   POS    Amphetamine Screen, Urine 06/15/2021  2:17 PM Unknown   NEG    Barbiturate Screen, Urine 06/15/2021  2:17 PM Unknown   NEG    Benzodiazepine Screen, Urine 06/15/2021  2:17 PM Unknown   NEG    Buprenorphine Urine 06/15/2021  2:17 PM Unknown   POS    Cocaine Metabolite Screen, Urine 06/15/2021  2:17 PM Unknown   NEG    FENTANYL SCREEN, URINE 06/15/2021  2:17 PM Unknown   NEG    Gabapentin Screen, Urine 06/15/2021  2:17 PM Unknown   N/A    MDMA, Urine 06/15/2021  2:17 PM Unknown   NEG    Methadone Screen, Urine 06/15/2021  2:17 PM Unknown   NEG    Methamphetamine, Urine 06/15/2021  2:17 PM Unknown   NEG    Opiate Scrn, Ur 06/15/2021  2:17 PM Unknown   NEG    Oxycodone Screen, Ur 06/15/2021  2:17 PM Unknown   NEG    PCP Screen, Urine 06/15/2021  2:17 PM Unknown   NEG    Propoxyphene Screen, Urine 06/15/2021  2:17 PM Unknown   N/A    Synthetic Cannabinoids (K2) Screen, Urine 06/15/2021  2:17 PM Unknown   NEG    THC Screen, Urine 06/15/2021  2:17 PM Unknown   POS    Tramadol Scrn, Ur 06/15/2021  2:17 PM Unknown   NEG    Tricyclic Antidepressants, Urine 06/15/2021  2:17 PM Unknown   N/A      We'll put her back on Suboxone 4 mg daily  Follow-up here 1 week  I reviewed the [place] Automated Rx Reporting System report     There does not appear to be any discrepancies or overprescribing of controlled substances        An electronic signature was used to authenticate this note.     --Bria Krishna MD

## 2021-06-22 ENCOUNTER — VIRTUAL VISIT (OUTPATIENT)
Dept: INTERNAL MEDICINE CLINIC | Age: 41
End: 2021-06-22
Payer: COMMERCIAL

## 2021-06-22 DIAGNOSIS — F19.10 POLYSUBSTANCE ABUSE (HCC): ICD-10-CM

## 2021-06-22 DIAGNOSIS — Z79.899 ENCOUNTER FOR MONITORING SUBOXONE MAINTENANCE THERAPY: ICD-10-CM

## 2021-06-22 DIAGNOSIS — Z51.81 ENCOUNTER FOR MONITORING SUBOXONE MAINTENANCE THERAPY: ICD-10-CM

## 2021-06-22 DIAGNOSIS — F11.20 SEVERE OPIOID USE DISORDER (HCC): Primary | ICD-10-CM

## 2021-06-22 PROCEDURE — 99213 OFFICE O/P EST LOW 20 MIN: CPT | Performed by: INTERNAL MEDICINE

## 2021-06-22 RX ORDER — BUPRENORPHINE AND NALOXONE 4; 1 MG/1; MG/1
1 FILM, SOLUBLE BUCCAL; SUBLINGUAL DAILY
Qty: 7 FILM | Refills: 0 | Status: SHIPPED | OUTPATIENT
Start: 2021-06-22 | End: 2021-06-29 | Stop reason: SDUPTHER

## 2021-06-22 NOTE — PROGRESS NOTES
2021    TELEHEALTH EVALUATION -- Audio/Visual (During [facility])    HPI:  A video conference was done with the patient  Patient was at home, I was in the office  There was no  one else  present during the conference    Troy Roman (:  1980) has requested an audio/video evaluation for the following concern(s):    HPI  I last saw the patient 6/15  She requested a video evaluation because her father is in the nursing home he has multiple myeloma  We weaned her off of Suboxone from November to February  She got down to 1 mg and then got off in February  She had been on Suboxone for about 8 years  She says she did not relapse but she has been getting Suboxone from a friend since around   I restarted Suboxone 6/15   [facility] has to pay $200 every 4 weeks  Patient has had problems using pain pills  Patient started using pain pills 10 years ago  A friend had given her a pain pill and she said it gave her energy  Patient uses it she usually would swallow them but at the end she was snorting them  Other drugs used: No  ROS-Patient is feeling well  Patient is not experiencing  withdrawal symptoms ,no urges or cravings  Patient is not having any side effects from the buprenorphine      Prior to Visit Medications    Medication Sig Taking? Authorizing Provider   buprenorphine-naloxone (SUBOXONE) 4-1 MG FILM SL film Place 1 Film under the tongue daily for 7 days. Yes Rosana Queen MD   buprenorphine-naloxone (SUBOXONE) 4-1 MG FILM SL film Place 1 Film under the tongue daily. Historical Provider, MD   escitalopram (LEXAPRO) 20 MG tablet take 1 tablet by mouth once daily  Historical Provider, MD       Social History     Tobacco Use    Smoking status: Current Every Day Smoker     Packs/day: 0.25    Smokeless tobacco: Never Used   Substance Use Topics    Alcohol use: Yes     Alcohol/week: 2.0 standard drinks     Types: 2 Shots of liquor per week    Drug use:  Yes Types: Marijuana, Opiates      Comment: last used percocet and vicodin 2013            PHYSICAL EXAMINATION:  [ INSTRUCTIONS:  \"[x]\" Indicates a positive item  \"[]\" Indicates a negative item  -- DELETE ALL ITEMS NOT EXAMINED]  No vitals done    Constitutional: [x] Appears well-developed and well-nourished [x] No apparent distress      [] Abnormal-   Mental status  [x] Alert and awake  [x] Oriented to person/place/time [x]Able to follow commands      Eyes:  EOM    [x]  Normal  [] Abnormal-  Sclera  []  Normal  [] Abnormal -         Discharge []  None visible  [] Abnormal -  Pupils normal           Psychiatric:       [x] Normal Affect [] No Hallucinations        [] Abnormal-        Diagnosis Orders   1. Severe opioid use disorder (HCC)  buprenorphine-naloxone (SUBOXONE) 4-1 MG FILM SL film   2. Encounter for monitoring Suboxone maintenance therapy     3. Polysubstance abuse ([facility] [address]           Stephani Escobedo is a 42 y.o. female being evaluated by a Virtual Visit (video visit) encounter to address concerns as mentioned above. A caregiver was present when appropriate. Due to this being a TeleHealth encounter (During [facility] public health emergency), evaluation of the following organ systems was limited: Vitals/Constitutional/EENT/Resp/CV/GI//MS/Neuro/Skin/Heme-Lymph-Imm. Pursuant to the emergency declaration under the [address] [place] [address] and the [email or web address] and [employer] Act, this Virtual Visit was conducted with patient's (and/or legal guardian's) consent, to reduce the patient's risk of exposure to COVID-19 and provide necessary medical care. The patient (and/or legal guardian) has also been advised to contact this office for worsening conditions or problems, and seek emergency medical treatment and/or call 911 if deemed necessary.      Services were provided through a video synchronous discussion virtually to substitute for in-person clinic visit. Patient is on 4 mg Suboxone daily  Obviously she is stressed out with her father situation  I told her I would be glad to review his records if he would give me permission  She says he is talking DNR comfort care, he is only 77  Follow-up 1 week  --Glennette Hashimoto, MD on 6/22/2021 at 2:20 PM    An electronic signature was used to authenticate this note.

## 2021-06-29 ENCOUNTER — OFFICE VISIT (OUTPATIENT)
Dept: INTERNAL MEDICINE CLINIC | Age: 41
End: 2021-06-29
Payer: COMMERCIAL

## 2021-06-29 VITALS
HEART RATE: 89 BPM | DIASTOLIC BLOOD PRESSURE: 88 MMHG | SYSTOLIC BLOOD PRESSURE: 136 MMHG | HEIGHT: 67 IN | WEIGHT: 134 LBS | BODY MASS INDEX: 21.03 KG/M2 | TEMPERATURE: 96.9 F

## 2021-06-29 DIAGNOSIS — F19.10 POLYSUBSTANCE ABUSE (HCC): ICD-10-CM

## 2021-06-29 DIAGNOSIS — Z79.899 ENCOUNTER FOR MONITORING SUBOXONE MAINTENANCE THERAPY: ICD-10-CM

## 2021-06-29 DIAGNOSIS — Z78.9 ALCOHOL USE: ICD-10-CM

## 2021-06-29 DIAGNOSIS — F11.20 SEVERE OPIOID USE DISORDER (HCC): Primary | ICD-10-CM

## 2021-06-29 DIAGNOSIS — Z51.81 ENCOUNTER FOR MONITORING SUBOXONE MAINTENANCE THERAPY: ICD-10-CM

## 2021-06-29 PROCEDURE — 80305 DRUG TEST PRSMV DIR OPT OBS: CPT | Performed by: INTERNAL MEDICINE

## 2021-06-29 PROCEDURE — 99213 OFFICE O/P EST LOW 20 MIN: CPT | Performed by: INTERNAL MEDICINE

## 2021-06-29 RX ORDER — BUPRENORPHINE AND NALOXONE 4; 1 MG/1; MG/1
1 FILM, SOLUBLE BUCCAL; SUBLINGUAL DAILY
Qty: 14 FILM | Refills: 0 | Status: SHIPPED | OUTPATIENT
Start: 2021-06-29 | End: 2021-07-13

## 2021-06-29 ASSESSMENT — ENCOUNTER SYMPTOMS: EYES NEGATIVE: 1

## 2021-06-29 NOTE — PROGRESS NOTES
Verbal order per Dr. Aey Freeman for urine drug screen. Positive for BUP Alcohol. Verified results with Edwina Jack RN. Dr. Aye Freeman ordered Suboxone 4mg film daily for patient. Verified dose with patient. Patient was sent home with 2 week script of Suboxone 4mg film daily and will be seen back in the office 7/13/21.

## 2021-06-29 NOTE — PROGRESS NOTES
Drake Nunes (:  1980) is a 42 y.o. female,Established patient, here for evaluation of the following chief complaint(s):  Drug Problem      ASSESSMENT/PLAN:  1. Severe opioid use disorder (HCC)  -     POCT Rapid Drug Screen  -     buprenorphine-naloxone (SUBOXONE) 4-1 MG FILM SL film; Place 1 Film under the tongue daily for 14 days. , Disp-14 Film, R-0Normal  2. Encounter for monitoring Suboxone maintenance therapy  3. Polysubstance abuse ([facility] [address]  4. Alcohol use    Plan will be to have her take Suboxone 2 mg every other day  We will do a virtual visit in 1 week  Patient is sure that she wants to get off of Suboxone  SUBJECTIVE/OBJECTIVE:  Drug Problem    I last saw her   I she was lost to follow-up since  February 10 we had 2 other visits that were virtual after that in February  We weaned her off of Suboxone from November to February  She got down to 1 mg and then got off in February  She had been on Suboxone for about 8 years  She says she did not relapse but she has been getting Suboxone from a friend since around March beginning  Last Suboxone was 2 days ago  She thinks she needs to get back on a low-dose   Patient was referred by a friend  [facility]  Patient has had problems using pain pills  Patient started using pain pills 10 years ago  A friend had given her a pain pill and she said it gave her energy  Patient uses it she usually would swallow them but at the end she was snorting them  Other drugs used: No    Review of Systems   Constitutional: Negative. Eyes: Negative. Neurological: Negative. Physical Exam  Constitutional:       Appearance: She is normal weight. Eyes:      Extraocular Movements: Extraocular movements intact. Pupils: Pupils are equal, round, and reactive to light. Skin:     General: Skin is warm. Neurological:      General: No focal deficit present.       Mental Status: She is alert.     Urine tox screen today  Alcohol, Urine 06/29/2021 11:04 AM Unknown   POS    Amphetamine Screen, Urine 06/29/2021 11:04 AM Unknown   NEG    Barbiturate Screen, Urine 06/29/2021 11:04 AM Unknown   NEG    Benzodiazepine Screen, Urine 06/29/2021 11:04 AM Unknown   NEG    Buprenorphine Urine 06/29/2021 11:04 AM Unknown   POS    Cocaine Metabolite Screen, Urine 06/29/2021 11:04 AM Unknown   NEG    FENTANYL SCREEN, URINE 06/29/2021 11:04 AM Unknown   NEG    Gabapentin Screen, Urine 06/29/2021 11:04 AM Unknown   N/A    MDMA, Urine 06/29/2021 11:04 AM Unknown   NEG    Methadone Screen, Urine 06/29/2021 11:04 AM Unknown   NEG    Methamphetamine, Urine 06/29/2021 11:04 AM Unknown   NEG    Opiate Scrn, Ur 06/29/2021 11:04 AM Unknown   NEG    Oxycodone Screen, Ur 06/29/2021 11:04 AM Unknown   NEG    PCP Screen, Urine 06/29/2021 11:04 AM Unknown   NEG    Propoxyphene Screen, Urine 06/29/2021 11:04 AM Unknown   N/A    Synthetic Cannabinoids (K2) Screen, Urine 06/29/2021 11:04 AM Unknown   NEG    THC Screen, Urine 06/29/2021 11:04 AM Unknown   NEG    Tramadol Scrn, Ur 06/29/2021 11:04 AM Unknown   NEG    Tricyclic Antidepressants, Urine 06/29/2021 11:04 AM Unknown   N/A        Social  I put her back on Suboxone on 6/15 on 4 mg daily  Follow-up here 2 weeks  I reviewed the [place] Automated Rx Reporting System report     There does not appear to be any discrepancies or overprescribing of controlled substances        An electronic signature was used to authenticate this note.     --Arlene Lopez MD

## 2021-07-14 ENCOUNTER — OFFICE VISIT (OUTPATIENT)
Dept: INTERNAL MEDICINE CLINIC | Age: 41
End: 2021-07-14
Payer: COMMERCIAL

## 2021-07-14 VITALS
TEMPERATURE: 98.1 F | BODY MASS INDEX: 21.66 KG/M2 | SYSTOLIC BLOOD PRESSURE: 122 MMHG | HEART RATE: 86 BPM | HEIGHT: 67 IN | DIASTOLIC BLOOD PRESSURE: 78 MMHG | WEIGHT: 138 LBS

## 2021-07-14 DIAGNOSIS — F11.20 SEVERE OPIOID USE DISORDER (HCC): Primary | ICD-10-CM

## 2021-07-14 DIAGNOSIS — Z79.899 ENCOUNTER FOR MONITORING SUBOXONE MAINTENANCE THERAPY: ICD-10-CM

## 2021-07-14 DIAGNOSIS — Z78.9 ALCOHOL USE: ICD-10-CM

## 2021-07-14 DIAGNOSIS — Z51.81 ENCOUNTER FOR MONITORING SUBOXONE MAINTENANCE THERAPY: ICD-10-CM

## 2021-07-14 DIAGNOSIS — F19.10 POLYSUBSTANCE ABUSE (HCC): ICD-10-CM

## 2021-07-14 PROCEDURE — 80305 DRUG TEST PRSMV DIR OPT OBS: CPT | Performed by: INTERNAL MEDICINE

## 2021-07-14 PROCEDURE — 99213 OFFICE O/P EST LOW 20 MIN: CPT | Performed by: INTERNAL MEDICINE

## 2021-07-14 RX ORDER — BUPRENORPHINE AND NALOXONE 4; 1 MG/1; MG/1
1 FILM, SOLUBLE BUCCAL; SUBLINGUAL DAILY
Qty: 14 FILM | Refills: 0 | Status: SHIPPED | OUTPATIENT
Start: 2021-07-14 | End: 2021-07-28

## 2021-07-14 ASSESSMENT — ENCOUNTER SYMPTOMS: EYES NEGATIVE: 1

## 2021-07-14 NOTE — PROGRESS NOTES
Eugenia Rubi (:  1980) is a 42 y.o. female,Established patient, here for evaluation of the following chief complaint(s):  Drug Problem      ASSESSMENT/PLAN:  1. Severe opioid use disorder (HCC)  -     POCT Rapid Drug Screen  -     buprenorphine-naloxone (SUBOXONE) 4-1 MG FILM SL film; Place 1 Film under the tongue daily for 14 days. , Disp-14 Film, R-0Normal  2. Encounter for monitoring Suboxone maintenance therapy  3. Alcohol use  4. Polysubstance abuse ([facility] [address]    Plan will be to have her take Suboxone 4 mg every  day  We will do a virtual visit in 2 weeks  She was unable to completely wean off Suboxone  SUBJECTIVE/OBJECTIVE:  Drug Problem    I last saw her   She was supposed to be here yesterday but she had work over  [email or web address] she was lost to follow-up since  February 10 we had 2 other visits that were virtual after that in February  We weaned her off of Suboxone from November to February  She got down to 1 mg and then got off in February  She had been on Suboxone for about 8 years  She says she did not relapse but she has been getting Suboxone from a friend since around March beginning  Last Suboxone was 2 days ago  She thinks she needs to get back on a low-dose   Patient was referred by a friend  She is on a Suboxone clinic on the other side [facility] has to pay $200 every 4 weeks  Patient has had problems using pain pills  Patient started using pain pills 10 years ago  A friend had given her a pain pill and she said it gave her energy  Patient uses it she usually would swallow them but at the end she was snorting them  Other drugs used: No    Review of Systems   Constitutional: Negative. Eyes: Negative. Neurological: Negative. Physical Exam  Constitutional:       Appearance: She is normal weight. Eyes:      Extraocular Movements: Extraocular movements intact. Pupils: Pupils are equal, round, and reactive to light. Skin:     General: Skin is warm.    Neurological:      General: No focal deficit present. Mental Status: She is alert. Urine tox screen today  Alcohol, Urine 07/14/2021  4:17 PM Unknown   NEG    Amphetamine Screen, Urine 07/14/2021  4:17 PM Unknown   NEG    Barbiturate Screen, Urine 07/14/2021  4:17 PM Unknown   NEG    Benzodiazepine Screen, Urine 07/14/2021  4:17 PM Unknown   NEG    Buprenorphine Urine 07/14/2021  4:17 PM Unknown   POS    Cocaine Metabolite Screen, Urine 07/14/2021  4:17 PM Unknown   NEG    FENTANYL SCREEN, URINE 07/14/2021  4:17 PM Unknown   NEG    Gabapentin Screen, Urine 07/14/2021  4:17 PM Unknown   N/A    MDMA, Urine 07/14/2021  4:17 PM Unknown   NEG    Methadone Screen, Urine 07/14/2021  4:17 PM Unknown   NEG    Methamphetamine, Urine 07/14/2021  4:17 PM Unknown   NEG    Opiate Scrn, Ur 07/14/2021  4:17 PM Unknown   NEG    Oxycodone Screen, Ur 07/14/2021  4:17 PM Unknown   NEG    PCP Screen, Urine 07/14/2021  4:17 PM Unknown   NEG    Propoxyphene Screen, Urine 07/14/2021  4:17 PM Unknown   N/A    Synthetic Cannabinoids (K2) Screen, Urine 07/14/2021  4:17 PM Unknown   NEG    THC Screen, Urine 07/14/2021  4:17 PM Unknown   POS    Tramadol Scrn, Ur 07/14/2021  4:17 PM Unknown   NEG    Tricyclic Antidepressants, Urine 07/14/2021  4:17 PM Unknown   N/A           I put her back on Suboxone on 6/15 on 4 mg daily  Follow-up here 2 weeks  I reviewed the [address] [place] [address] Automated Rx Reporting System report     There does not appear to be any discrepancies or overprescribing of controlled substances        An electronic signature was used to authenticate this note.     --Tigre Romero MD

## 2021-07-14 NOTE — PROGRESS NOTES
Verbal order per Dr. Elton Feliz for urine drug screen. Positive for BUP THC. Verified results with Citlalli Rolle. Dr. Elton Feliz ordered Suboxone 4mg film daily  for patient. Verified dose with patient. Patient was sent home with 2 week script of Suboxone 4mg film daily and will be seen back in the office 7/28/21.

## 2021-07-28 ENCOUNTER — VIRTUAL VISIT (OUTPATIENT)
Dept: INTERNAL MEDICINE CLINIC | Age: 41
End: 2021-07-28

## 2021-07-28 DIAGNOSIS — F11.20 SEVERE OPIOID USE DISORDER (HCC): ICD-10-CM

## 2021-07-28 PROCEDURE — 99024 POSTOP FOLLOW-UP VISIT: CPT | Performed by: INTERNAL MEDICINE

## 2021-07-28 RX ORDER — BUPRENORPHINE AND NALOXONE 4; 1 MG/1; MG/1
1 FILM, SOLUBLE BUCCAL; SUBLINGUAL DAILY
Qty: 14 FILM | Refills: 0 | Status: CANCELLED | OUTPATIENT
Start: 2021-07-28 | End: 2021-08-11

## 2021-07-29 ENCOUNTER — VIRTUAL VISIT (OUTPATIENT)
Dept: INTERNAL MEDICINE CLINIC | Age: 41
End: 2021-07-29
Payer: COMMERCIAL

## 2021-07-29 DIAGNOSIS — Z51.81 ENCOUNTER FOR MONITORING SUBOXONE MAINTENANCE THERAPY: ICD-10-CM

## 2021-07-29 DIAGNOSIS — Z78.9 ALCOHOL USE: ICD-10-CM

## 2021-07-29 DIAGNOSIS — Z79.899 ENCOUNTER FOR MONITORING SUBOXONE MAINTENANCE THERAPY: ICD-10-CM

## 2021-07-29 DIAGNOSIS — F19.10 POLYSUBSTANCE ABUSE (HCC): ICD-10-CM

## 2021-07-29 DIAGNOSIS — F11.20 SEVERE OPIOID USE DISORDER (HCC): Primary | ICD-10-CM

## 2021-07-29 PROCEDURE — 99213 OFFICE O/P EST LOW 20 MIN: CPT | Performed by: INTERNAL MEDICINE

## 2021-07-29 RX ORDER — BUPRENORPHINE AND NALOXONE 4; 1 MG/1; MG/1
1 FILM, SOLUBLE BUCCAL; SUBLINGUAL DAILY
Qty: 14 FILM | Refills: 0 | Status: SHIPPED | OUTPATIENT
Start: 2021-07-29 | End: 2021-08-12

## 2021-07-29 NOTE — PROGRESS NOTES
2021    TELEHEALTH EVALUATION -- Audio/Visual (During OQAFP-77 public health emergency)    HPI:  A video conference was done with the patient  Patient was at home, I was in the office  There was no  one else  present during the conference    Megan Novak (:  1980) has requested an audio/video evaluation for the following concern(s):    HPI  I last saw her   We were supposed to do a virtual visit yesterday but she was at work and only had 40 minutes for lunch  By the time I got to her her lunch was over    I she was lost to follow-up since  February 10 we had 2 other visits that were virtual after that in February  We weaned her off of Suboxone from November to February  She got down to 1 mg and then got off in February  She had been on Suboxone for about 8 years  She says she did not relapse but she has been getting Suboxone from a friend since around March beginning  Last Suboxone was 2 days ago  She thinks she needs to get back on a low-dose   Patient was referred by a friend  She is on a Suboxone clinic on the other side [facility] has to pay $200 every 4 weeks  Patient has had problems using pain pills  Patient started using pain pills 10 years ago  A friend had given her a pain pill and she said it gave her energy  Patient uses it she usually would swallow them but at the end she was snorting them  Other drugs used: No  ROS-Patient is feeling well  Patient is not experiencing  withdrawal symptoms ,no urges or cravings  Patient is not having any side effects from the buprenorphine      Prior to Visit Medications    Medication Sig Taking? Authorizing Provider   buprenorphine-naloxone (SUBOXONE) 4-1 MG FILM SL film Place 1 Film under the tongue daily for 14 days.  Yes Jose Elias Palafox MD   escitalopram (LEXAPRO) 20 MG tablet take 1 tablet by mouth once daily  Historical Provider, MD       Social History     Tobacco Use    Smoking status: Current Every Day Smoker     Packs/day: 0.25    Smokeless tobacco: Never Used   Substance Use Topics    Alcohol use: Yes     Alcohol/week: 2.0 standard drinks     Types: 2 Shots of liquor per week    Drug use: Yes     Types: Marijuana, Opiates      Comment: last used percocet and vicodin 2013            PHYSICAL EXAMINATION:  [ INSTRUCTIONS:  \"[x]\" Indicates a positive item  \"[]\" Indicates a negative item  -- DELETE ALL ITEMS NOT EXAMINED]  No vitals done    Constitutional: [x] Appears well-developed and well-nourished [x] No apparent distress      [] Abnormal-   Mental status  [x] Alert and awake  [x] Oriented to person/place/time [x]Able to follow commands      Eyes:  EOM    [x]  Normal  [] Abnormal-  Sclera  []  Normal  [] Abnormal -         Discharge []  None visible  [] Abnormal -  Pupils normal           Psychiatric:       [x] Normal Affect [] No Hallucinations        [] Abnormal-        Diagnosis Orders   1. Severe opioid use disorder (HCC)  buprenorphine-naloxone (SUBOXONE) 4-1 MG FILM SL film   2. Encounter for monitoring Suboxone maintenance therapy     3. Polysubstance abuse ([facility] [address]     4. Alcohol use           Jerry Stanton is a 42 y.o. female being evaluated by a Virtual Visit (video visit) encounter to address concerns as mentioned above. A caregiver was present when appropriate. Due to this being a TeleHealth encounter (During GALGN-01 public health emergency), evaluation of the following organ systems was limited: Vitals/Constitutional/EENT/Resp/CV/GI//MS/Neuro/Skin/Heme-Lymph-Imm. Pursuant to the emergency declaration under the [address] [place] [address] authority and the [employer] and [employer] Act, this Virtual Visit was conducted with patient's (and/or legal guardian's) consent, to reduce the patient's risk of exposure to COVID-19 and provide necessary medical care.   The patient (and/or legal guardian) has also been advised to contact this office for worsening conditions or problems, and seek emergency medical treatment and/or call 911 if deemed necessary. Services were provided through a video synchronous discussion virtually to substitute for in-person clinic visit. Patient is doing well  Continue Suboxone 4 mg  daily  Follow-up 2 weeks  I reviewed the [place] Automated Rx Reporting System report     There does not appear to be any discrepancies or overprescribing of controlled substances      --Jose Reza MD on 7/29/2021 at 4:10 PM    An electronic signature was used to authenticate this note.

## 2021-08-12 ENCOUNTER — OFFICE VISIT (OUTPATIENT)
Dept: INTERNAL MEDICINE CLINIC | Age: 41
End: 2021-08-12
Payer: COMMERCIAL

## 2021-08-12 ENCOUNTER — NURSE ONLY (OUTPATIENT)
Dept: LAB | Age: 41
End: 2021-08-12

## 2021-08-12 DIAGNOSIS — Z32.00 POSSIBLE PREGNANCY: ICD-10-CM

## 2021-08-12 DIAGNOSIS — F19.10 POLYSUBSTANCE ABUSE (HCC): ICD-10-CM

## 2021-08-12 DIAGNOSIS — Z79.899 ENCOUNTER FOR MONITORING SUBOXONE MAINTENANCE THERAPY: ICD-10-CM

## 2021-08-12 DIAGNOSIS — Z51.81 ENCOUNTER FOR MONITORING SUBOXONE MAINTENANCE THERAPY: ICD-10-CM

## 2021-08-12 DIAGNOSIS — Z34.90 PREGNANCY, UNSPECIFIED GESTATIONAL AGE: ICD-10-CM

## 2021-08-12 DIAGNOSIS — F11.20 SEVERE OPIOID USE DISORDER (HCC): Primary | ICD-10-CM

## 2021-08-12 DIAGNOSIS — Z78.9 ALCOHOL USE: ICD-10-CM

## 2021-08-12 LAB
ALCOHOL URINE: ABNORMAL
AMPHETAMINE SCREEN, URINE: ABNORMAL
BARBITURATE SCREEN, URINE: ABNORMAL
BENZODIAZEPINE SCREEN, URINE: ABNORMAL
BUPRENORPHINE URINE: ABNORMAL
COCAINE METABOLITE SCREEN URINE: ABNORMAL
FENTANYL SCREEN, URINE: ABNORMAL
GABAPENTIN SCREEN, URINE: ABNORMAL
HCG,BETA SUBUNIT,QUAL,SERUM: ABNORMAL MIU/ML (ref 0–5)
MDMA URINE: ABNORMAL
METHADONE SCREEN, URINE: ABNORMAL
METHAMPHETAMINE, URINE: ABNORMAL
OPIATE SCREEN URINE: ABNORMAL
OXYCODONE SCREEN URINE: ABNORMAL
PHENCYCLIDINE SCREEN URINE: ABNORMAL
PROPOXYPHENE SCREEN, URINE: ABNORMAL
SYNTHETIC CANNABINOIDS(K2) SCREEN, URINE: ABNORMAL
THC SCREEN, URINE: ABNORMAL
TRAMADOL SCREEN URINE: ABNORMAL
TRICYCLIC ANTIDEPRESSANTS, UR: ABNORMAL

## 2021-08-12 PROCEDURE — 80305 DRUG TEST PRSMV DIR OPT OBS: CPT | Performed by: INTERNAL MEDICINE

## 2021-08-12 PROCEDURE — 99213 OFFICE O/P EST LOW 20 MIN: CPT | Performed by: INTERNAL MEDICINE

## 2021-08-12 RX ORDER — BUPRENORPHINE AND NALOXONE 4; 1 MG/1; MG/1
1 FILM, SOLUBLE BUCCAL; SUBLINGUAL DAILY
Qty: 14 FILM | Refills: 0 | Status: CANCELLED | OUTPATIENT
Start: 2021-08-12 | End: 2021-08-26

## 2021-08-12 RX ORDER — BUPRENORPHINE 2 MG/1
2 TABLET SUBLINGUAL 2 TIMES DAILY
Qty: 14 TABLET | Refills: 0 | Status: SHIPPED | OUTPATIENT
Start: 2021-08-12 | End: 2021-08-19 | Stop reason: SDUPTHER

## 2021-08-12 ASSESSMENT — ENCOUNTER SYMPTOMS: EYES NEGATIVE: 1

## 2021-08-12 NOTE — PROGRESS NOTES
Verbal order per Dr. Nick García for urine drug screen. Positive for BUP ETG THC. Verified results with Linda Conklin. Dr. Nick García ordered Subutex 2mg tab BID  for patient. Verified dose with patient. Patient was sent home with 1 week script of Subutex 2mg tab BID and will be seen back in the office 8/19/21.

## 2021-08-12 NOTE — PROGRESS NOTES
Ary Daily (:  1980) is a 42 y.o. female,Established patient, here for evaluation of the following chief complaint(s):  Drug Problem      ASSESSMENT/PLAN:  1. Severe opioid use disorder (HCC)  -     POCT Rapid Drug Screen  -     buprenorphine (SUBUTEX) 2 MG SUBL SL tablet; Place 1 tablet under the tongue 2 times daily for 7 days. , Disp-14 tablet, R-0Normal  2. Possible pregnancy  -     HCG, Quantitative, Pregnancy; Future  3. Encounter for monitoring Suboxone maintenance therapy  4. Polysubstance abuse ([facility] [address]  5. Alcohol use  6. Pregnancy, unspecified gestational age    Plan will be to have her take Suboxone 4 mg every  day  We will do a virtual visit in 2 weeks  She was unable to completely wean off Suboxone  SUBJECTIVE/OBJECTIVE:  Drug Problem    I last saw her   We did a virtual visit   She says she thinks she is pregnant she has had 2+ pregnancy tests at home  She is about a month late for her. We weaned her off of Suboxone from November to February  She got down to 1 mg and then got off in February  She had been on Suboxone for about 8 years  She says she did not relapse but she has been getting Suboxone from a friend since around March beginning  Last Suboxone was 2 days ago  She thinks she needs to get back on a low-dose   Patient was referred by a friend  She is on a Suboxone clinic on the other side [facility] has to pay $200 every 4 weeks  Patient has had problems using pain pills  Patient started using pain pills 10 years ago  A friend had given her a pain pill and she said it gave her energy  Patient uses it she usually would swallow them but at the end she was snorting them  Other drugs used: No    Review of Systems   Constitutional: Negative. Eyes: Negative. Neurological: Negative. Physical Exam  Constitutional:       Appearance: She is normal weight. Eyes:      Extraocular Movements: Extraocular movements intact.       Pupils: Pupils are equal, round, and reactive to light. Skin:     General: Skin is warm. Neurological:      General: No focal deficit present. Mental Status: She is alert. Urine tox screen today  Alcohol, Urine 08/12/2021  1:42 PM Unknown   POS    Amphetamine Screen, Urine 08/12/2021  1:42 PM Unknown   NEG    Barbiturate Screen, Urine 08/12/2021  1:42 PM Unknown   NEG    Benzodiazepine Screen, Urine 08/12/2021  1:42 PM Unknown   NEG    Buprenorphine Urine 08/12/2021  1:42 PM Unknown   POS    Cocaine Metabolite Screen, Urine 08/12/2021  1:42 PM Unknown   NEG    FENTANYL SCREEN, URINE 08/12/2021  1:42 PM Unknown   NEG    Gabapentin Screen, Urine 08/12/2021  1:42 PM Unknown   N/A    MDMA, Urine 08/12/2021  1:42 PM Unknown   NEG    Methadone Screen, Urine 08/12/2021  1:42 PM Unknown   NEG    Methamphetamine, Urine 08/12/2021  1:42 PM Unknown   NEG    Opiate Scrn, Ur 08/12/2021  1:42 PM Unknown   NEG    Oxycodone Screen, Ur 08/12/2021  1:42 PM Unknown   NEG    PCP Screen, Urine 08/12/2021  1:42 PM Unknown   NEG    Propoxyphene Screen, Urine 08/12/2021  1:42 PM Unknown   N/A    Synthetic Cannabinoids (K2) Screen, Urine 08/12/2021  1:42 PM Unknown   NEG    THC Screen, Urine 08/12/2021  1:42 PM Unknown   POS    Tramadol Scrn, Ur 08/12/2021  1:42 PM Unknown   NEG    Tricyclic Antidepressants, Urine 08/12/2021  1:42 PM Unknown   N/A    Lab and       I put her back on Suboxone on 6/15 on 4 mg daily  We will send a beta hCG  Switch her over to Subutex  Follow-up here 1 week with a virtual visit  I reviewed the [place] Automated Rx Reporting System report     There does not appear to be any discrepancies or overprescribing of controlled substances  She would like to see Dr. Rosario Hsieh for the pregnancy      An electronic signature was used to authenticate this note.     --Thom Daniels MD

## 2021-08-19 ENCOUNTER — VIRTUAL VISIT (OUTPATIENT)
Dept: INTERNAL MEDICINE CLINIC | Age: 41
End: 2021-08-19
Payer: COMMERCIAL

## 2021-08-19 DIAGNOSIS — Z78.9 ALCOHOL USE: ICD-10-CM

## 2021-08-19 DIAGNOSIS — Z51.81 ENCOUNTER FOR MONITORING SUBOXONE MAINTENANCE THERAPY: ICD-10-CM

## 2021-08-19 DIAGNOSIS — Z34.90 PREGNANCY, UNSPECIFIED GESTATIONAL AGE: ICD-10-CM

## 2021-08-19 DIAGNOSIS — Z79.899 ENCOUNTER FOR MONITORING SUBOXONE MAINTENANCE THERAPY: ICD-10-CM

## 2021-08-19 DIAGNOSIS — F11.20 SEVERE OPIOID USE DISORDER (HCC): Primary | ICD-10-CM

## 2021-08-19 DIAGNOSIS — F19.10 POLYSUBSTANCE ABUSE (HCC): ICD-10-CM

## 2021-08-19 PROCEDURE — 99213 OFFICE O/P EST LOW 20 MIN: CPT | Performed by: INTERNAL MEDICINE

## 2021-08-19 RX ORDER — BUPRENORPHINE 2 MG/1
2 TABLET SUBLINGUAL 2 TIMES DAILY
Qty: 28 TABLET | Refills: 0 | Status: SHIPPED | OUTPATIENT
Start: 2021-08-19 | End: 2021-09-02 | Stop reason: SDUPTHER

## 2021-08-19 NOTE — PROGRESS NOTES
2021    TELEHEALTH EVALUATION -- Audio/Visual (During GBEAR-77 public health emergency)    HPI:  A video conference was done with the patient  Patient was in her car, I was in the office  There was no  one else  present during the conference    Denisse Salgado (:  1980) has requested an audio/video evaluation for the following concern(s):    HPI  I last saw her   We did a virtual visit   She had 2 home pregnancy test that were positive  I sent a beta hCG which is elevated  I suspect she is about 6 to 7 weeks along  We weaned her off of Suboxone from November to February  She got down to 1 mg and then got off in February  She had been on Suboxone for about 8 years  She says she did not relapse but she has been getting Suboxone from a friend since around March beginning  Last Suboxone was 2 days ago  She thinks she needs to get back on a low-dose   Patient was referred by a friend  She is on a Suboxone clinic on the other side [facility] has to pay $200 every 4 weeks  Patient has had problems using pain pills  Patient started using pain pills 10 years ago  A friend had given her a pain pill and she said it gave her energy  Patient uses it she usually would swallow them but at the end she was snorting them  Other drugs used: No    ROS-Patient is feeling well  Patient is not experiencing  withdrawal symptoms ,no urges or cravings  Patient is not having any side effects from the buprenorphine      Prior to Visit Medications    Medication Sig Taking? Authorizing Provider   buprenorphine (SUBUTEX) 2 MG SUBL SL tablet Place 1 tablet under the tongue 2 times daily for 14 days. Yes Slick Allison MD   escitalopram (LEXAPRO) 20 MG tablet take 1 tablet by mouth once daily  Historical Provider, MD       Social History     Tobacco Use    Smoking status: Current Every Day Smoker     Packs/day: 0.25    Smokeless tobacco: Never Used   Substance Use Topics    Alcohol use:  Yes     Alcohol/week: 2.0 standard drinks     Types: 2 Shots of liquor per week    Drug use: Yes     Types: Marijuana, Opiates      Comment: last used percocet and vicodin 2013            PHYSICAL EXAMINATION:  [ INSTRUCTIONS:  \"[x]\" Indicates a positive item  \"[]\" Indicates a negative item  -- DELETE ALL ITEMS NOT EXAMINED]  No vitals done    Constitutional: [x] Appears well-developed and well-nourished [x] No apparent distress      [] Abnormal-   Mental status  [x] Alert and awake  [x] Oriented to person/place/time [x]Able to follow commands      Eyes:  EOM    [x]  Normal  [] Abnormal-  Sclera  []  Normal  [] Abnormal -         Discharge []  None visible  [] Abnormal -  Pupils normal           Psychiatric:       [x] Normal Affect [] No Hallucinations        [] Abnormal-        Diagnosis Orders   1. Severe opioid use disorder (HCC)  buprenorphine (SUBUTEX) 2 MG SUBL SL tablet   2. Encounter for monitoring Suboxone maintenance therapy     3. Polysubstance abuse ([facility] [address]     4. Pregnancy, unspecified gestational age     11. Alcohol use           Gloria Leach is a 42 y.o. female being evaluated by a Virtual Visit (video visit) encounter to address concerns as mentioned above. A caregiver was present when appropriate. Due to this being a TeleHealth encounter (During [facility]-06 public health emergency), evaluation of the following organ systems was limited: Vitals/Constitutional/EENT/Resp/CV/GI//MS/Neuro/Skin/Heme-Lymph-Imm. Pursuant to the emergency declaration under the [address] [place] [address] and the [employer] and [employer] Act, this Virtual Visit was conducted with patient's (and/or legal guardian's) consent, to reduce the patient's risk of exposure to COVID-19 and provide necessary medical care.   The patient (and/or legal guardian) has also been advised to contact this office for worsening conditions or problems, and seek emergency medical treatment and/or call 911 if deemed necessary. Services were provided through a video synchronous discussion virtually to substitute for in-person clinic visit. She is doing well  Continue Subutex 2 mg twice daily  She has an appointment to see OB in about 3 weeks  Follow-up with me in 2 weeks  --Tigre Romero MD on 8/19/2021 at 5:33 PM    An electronic signature was used to authenticate this note.

## 2021-09-02 ENCOUNTER — OFFICE VISIT (OUTPATIENT)
Dept: INTERNAL MEDICINE CLINIC | Age: 41
End: 2021-09-02
Payer: COMMERCIAL

## 2021-09-02 VITALS
TEMPERATURE: 98.4 F | SYSTOLIC BLOOD PRESSURE: 132 MMHG | WEIGHT: 138 LBS | DIASTOLIC BLOOD PRESSURE: 86 MMHG | HEART RATE: 76 BPM | HEIGHT: 67 IN | BODY MASS INDEX: 21.66 KG/M2

## 2021-09-02 DIAGNOSIS — Z78.9 ALCOHOL USE: ICD-10-CM

## 2021-09-02 DIAGNOSIS — Z79.899 ENCOUNTER FOR MONITORING SUBOXONE MAINTENANCE THERAPY: ICD-10-CM

## 2021-09-02 DIAGNOSIS — Z34.90 PREGNANCY, UNSPECIFIED GESTATIONAL AGE: ICD-10-CM

## 2021-09-02 DIAGNOSIS — Z11.59 SCREENING FOR VIRAL DISEASE: ICD-10-CM

## 2021-09-02 DIAGNOSIS — F19.10 POLYSUBSTANCE ABUSE (HCC): ICD-10-CM

## 2021-09-02 DIAGNOSIS — Z51.81 ENCOUNTER FOR MONITORING SUBOXONE MAINTENANCE THERAPY: ICD-10-CM

## 2021-09-02 DIAGNOSIS — F11.20 SEVERE OPIOID USE DISORDER (HCC): Primary | ICD-10-CM

## 2021-09-02 PROCEDURE — 99213 OFFICE O/P EST LOW 20 MIN: CPT | Performed by: INTERNAL MEDICINE

## 2021-09-02 PROCEDURE — 80305 DRUG TEST PRSMV DIR OPT OBS: CPT | Performed by: INTERNAL MEDICINE

## 2021-09-02 RX ORDER — BUPRENORPHINE 2 MG/1
2 TABLET SUBLINGUAL 2 TIMES DAILY
Qty: 28 TABLET | Refills: 0 | Status: SHIPPED | OUTPATIENT
Start: 2021-09-02 | End: 2021-09-16 | Stop reason: SDUPTHER

## 2021-09-02 ASSESSMENT — ENCOUNTER SYMPTOMS: EYES NEGATIVE: 1

## 2021-09-02 NOTE — PROGRESS NOTES
Verbal order per Dr. Demario Solano for urine drug screen. Positive for BUP ETG THC. Verified results with Shellie Patterson. Dr. Demario Solano ordered Subutex 8mg tab BID  for patient. Verified dose with patient. Patient was sent home with 2 week script of Subutex 8mg tab BID and will be seen back in the office 9/16/21.

## 2021-09-02 NOTE — PROGRESS NOTES
Emile Damian (:  1980) is a 42 y.o. female,Established patient, here for evaluation of the following chief complaint(s):  Drug Problem      ASSESSMENT/PLAN:  1. Severe opioid use disorder (HCC)  -     POCT Rapid Drug Screen  -     buprenorphine (SUBUTEX) 2 MG SUBL SL tablet; Place 1 tablet under the tongue 2 times daily for 14 days. , Disp-28 tablet, R-0Normal  2. Screening for viral disease  -     COVID-19; Future  3. Encounter for monitoring Suboxone maintenance therapy  4. Pregnancy, unspecified gestational age  11. Alcohol use  6. Polysubstance abuse ([facility] [address]    Plan will be to have her take Subutex 4 mg every  day  We will do a virtual visit in 2 weeks  We will send a Covid test  Every visit she has alcohol I need to discuss this further in depth with the patient especially since she is pregnant  SUBJECTIVE/OBJECTIVE:  Drug Problem    I last saw her   We did a virtual visit   Patient says she does not really feel good today  She says she has a headache she has no energy she did not go to work  She has diffuse body aches  No documented fever  Cough and congestion  She is pregnant has her first appointment with OB coming up  We weaned her off of Suboxone from November to February  She got down to 1 mg and then got off in February  She had been on Suboxone for about 8 years  She says she did not relapse but she has been getting Suboxone from a friend since around March beginning  Last Suboxone was 2 days ago  She thinks she needs to get back on a low-dose   Patient was referred by a friend  She is on a Suboxone clinic on the other side [facility] has to pay $200 every 4 weeks  Patient has had problems using pain pills  Patient started using pain pills 10 years ago  A friend had given her a pain pill and she said it gave her energy  Patient uses it she usually would swallow them but at the end she was snorting them  Other drugs used: No    Review of Systems   Constitutional: Negative.     Eyes:

## 2021-09-16 ENCOUNTER — VIRTUAL VISIT (OUTPATIENT)
Dept: INTERNAL MEDICINE CLINIC | Age: 41
End: 2021-09-16
Payer: COMMERCIAL

## 2021-09-16 DIAGNOSIS — F11.20 SEVERE OPIOID USE DISORDER (HCC): Primary | ICD-10-CM

## 2021-09-16 DIAGNOSIS — Z51.81 ENCOUNTER FOR MONITORING SUBOXONE MAINTENANCE THERAPY: ICD-10-CM

## 2021-09-16 DIAGNOSIS — Z79.899 ENCOUNTER FOR MONITORING SUBOXONE MAINTENANCE THERAPY: ICD-10-CM

## 2021-09-16 DIAGNOSIS — Z78.9 ALCOHOL USE: ICD-10-CM

## 2021-09-16 DIAGNOSIS — U07.1 COVID-19: ICD-10-CM

## 2021-09-16 DIAGNOSIS — Z3A.11 11 WEEKS GESTATION OF PREGNANCY: ICD-10-CM

## 2021-09-16 PROCEDURE — 99213 OFFICE O/P EST LOW 20 MIN: CPT | Performed by: INTERNAL MEDICINE

## 2021-09-16 RX ORDER — BUPRENORPHINE 2 MG/1
2 TABLET SUBLINGUAL 2 TIMES DAILY
Qty: 42 TABLET | Refills: 0 | Status: SHIPPED | OUTPATIENT
Start: 2021-09-16 | End: 2021-10-07

## 2021-09-16 NOTE — PROGRESS NOTES
2021    TELEHEALTH EVALUATION -- Audio/Visual (During UVPXI-00 public health emergency)    HPI:  A video conference was done with the patient  Patient was at home, I was in the office  There was no  one else  present during the conference    Wendy Stanton (:  1980) has requested an audio/video evaluation for the following concern(s):    HPI    I last saw her    At that visit she was feeling poorly  She had a headache no energy cough congestion did not go to work that day  We sent a Covid test which was positive  She said her boyfriend and her son had it as well  She lost her taste and smell felt bad for a week  She is going back to work next Tuesday    She is pregnant had her first appointment with OB , she is about 11 weeks along  We weaned her off of Suboxone from November to February  She got down to 1 mg and then got off in February  She had been on Suboxone for about 8 years  She says she did not relapse but she has been getting Suboxone from a friend since around March beginning  Last Suboxone was 2 days ago  She thinks she needs to get back on a low-dose   Patient was referred by a friend  She is on a Suboxone clinic on the other side of [facility] has to pay $200 every 4 weeks  Patient has had problems using pain pills  Patient started using pain pills 10 years ago  A friend had given her a pain pill and she said it gave her energy  Patient uses it she usually would swallow them but at the end she was snorting them  ROS-Patient is feeling well  Patient is not experiencing  withdrawal symptoms ,no urges or cravings  Patient is not having any side effects from the buprenorphine      Prior to Visit Medications    Medication Sig Taking? Authorizing Provider   buprenorphine (SUBUTEX) 2 MG SUBL SL tablet Place 1 tablet under the tongue 2 times daily for 21 days.  Yes Tania Hurst MD   escitalopram (LEXAPRO) 20 MG tablet take 1 tablet by mouth once daily  Historical Provider, MD       Social History     Tobacco Use    Smoking status: Current Every Day Smoker     Packs/day: 0.25    Smokeless tobacco: Never Used   Substance Use Topics    Alcohol use: Yes     Alcohol/week: 2.0 standard drinks     Types: 2 Shots of liquor per week    Drug use: Yes     Types: Marijuana, Opiates      Comment: last used percocet and vicodin 2013            PHYSICAL EXAMINATION:  [ INSTRUCTIONS:  \"[x]\" Indicates a positive item  \"[]\" Indicates a negative item  -- DELETE ALL ITEMS NOT EXAMINED]  No vitals done    Constitutional: [x] Appears well-developed and well-nourished [x] No apparent distress      [] Abnormal-   Mental status  [x] Alert and awake  [x] Oriented to person/place/time [x]Able to follow commands      Eyes:  EOM    [x]  Normal  [] Abnormal-  Sclera  []  Normal  [] Abnormal -         Discharge []  None visible  [] Abnormal -  Pupils normal           Psychiatric:       [x] Normal Affect [] No Hallucinations        [] Abnormal-        Diagnosis Orders   1. Severe opioid use disorder (HCC)  buprenorphine (SUBUTEX) 2 MG SUBL SL tablet   2. Encounter for monitoring Suboxone maintenance therapy     3. 11 weeks gestation of pregnancy     4. Alcohol use     5. COVID-19           Mason Luis is a 42 y.o. female being evaluated by a Virtual Visit (video visit) encounter to address concerns as mentioned above. A caregiver was present when appropriate. Due to this being a TeleHealth encounter (During [facility]-95 public health emergency), evaluation of the following organ systems was limited: Vitals/Constitutional/EENT/Resp/CV/GI//MS/Neuro/Skin/Heme-Lymph-Imm.   Pursuant to the emergency declaration under the [facility], [address] [place] [address] authority and the [employer] and [employer] Act, this Virtual Visit was conducted with patient's (and/or legal guardian's) consent, to reduce the patient's risk of exposure to COVID-19 and provide necessary medical

## 2021-10-11 ENCOUNTER — OFFICE VISIT (OUTPATIENT)
Dept: INTERNAL MEDICINE CLINIC | Age: 41
End: 2021-10-11
Payer: COMMERCIAL

## 2021-10-11 VITALS
HEART RATE: 94 BPM | SYSTOLIC BLOOD PRESSURE: 114 MMHG | HEIGHT: 67 IN | DIASTOLIC BLOOD PRESSURE: 66 MMHG | TEMPERATURE: 98.1 F | WEIGHT: 131 LBS | BODY MASS INDEX: 20.56 KG/M2

## 2021-10-11 DIAGNOSIS — Z51.81 ENCOUNTER FOR MONITORING SUBOXONE MAINTENANCE THERAPY: ICD-10-CM

## 2021-10-11 DIAGNOSIS — Z78.9 ALCOHOL USE: ICD-10-CM

## 2021-10-11 DIAGNOSIS — Z3A.15 15 WEEKS GESTATION OF PREGNANCY: ICD-10-CM

## 2021-10-11 DIAGNOSIS — Z79.899 ENCOUNTER FOR MONITORING SUBOXONE MAINTENANCE THERAPY: ICD-10-CM

## 2021-10-11 DIAGNOSIS — F11.20 SEVERE OPIOID USE DISORDER (HCC): Primary | ICD-10-CM

## 2021-10-11 PROCEDURE — 99213 OFFICE O/P EST LOW 20 MIN: CPT | Performed by: INTERNAL MEDICINE

## 2021-10-11 PROCEDURE — 80305 DRUG TEST PRSMV DIR OPT OBS: CPT | Performed by: INTERNAL MEDICINE

## 2021-10-11 RX ORDER — BUPRENORPHINE 2 MG/1
2 TABLET SUBLINGUAL 2 TIMES DAILY
Qty: 42 TABLET | Refills: 0 | Status: SHIPPED | OUTPATIENT
Start: 2021-10-11 | End: 2021-11-01

## 2021-10-11 ASSESSMENT — ENCOUNTER SYMPTOMS: EYES NEGATIVE: 1

## 2021-10-11 NOTE — PROGRESS NOTES
Laure Feliz (:  1980) is a 42 y.o. female,Established patient, here for evaluation of the following chief complaint(s):  Drug Problem      ASSESSMENT/PLAN:  1. Severe opioid use disorder (HCC)  -     POCT Rapid Drug Screen  -     buprenorphine (SUBUTEX) 2 MG SUBL SL tablet; Place 1 tablet under the tongue 2 times daily for 21 days. , Disp-42 tablet, R-0Normal  2. Encounter for monitoring Suboxone maintenance therapy  3. Alcohol use  4. 15 weeks gestation of pregnancy    Plan will be to have her take Subutex 4 mg every  day  We will do a virtual visit in 2 weeks  Alcohol negative today  SUBJECTIVE/OBJECTIVE:  Drug Problem    I last saw her   We did a virtual visit   Currently the patient is about 15 weeks along in her pregnancy  She found an OB doctor in [address] [place] [address] visit she was feeling terrible cough congestion she tested positive for Covid  She said she lost 10 pounds with Covid  We weaned her off of Suboxone from November to February  She got down to 1 mg and then got off in February  She had been on Suboxone for about 8 years  She says she did not relapse but she has been getting Suboxone from a friend since around March beginning  Last Suboxone was 2 days ago  She thinks she needs to get back on a low-dose   Patient was referred by a friend  She is on a Suboxone clinic on the other side of [facility] has to pay $200 every 4 weeks  Patient has had problems using pain pills  Patient started using pain pills 10 years ago  A friend had given her a pain pill and she said it gave her energy  Patient uses it she usually would swallow them but at the end she was snorting them  Other drugs used: No    Review of Systems   Constitutional: Negative. Eyes: Negative. Neurological: Negative. Physical Exam  Constitutional:       Appearance: She is normal weight. Eyes:      Extraocular Movements: Extraocular movements intact. Pupils: Pupils are equal, round, and reactive to light. Skin:     General: Skin is warm. Neurological:      General: No focal deficit present. Mental Status: She is alert. Urine tox screen today  Alcohol, Urine 10/11/2021  4:03 PM Unknown   NEG    Amphetamine Screen, Urine 10/11/2021  4:03 PM Unknown   NEG    Barbiturate Screen, Urine 10/11/2021  4:03 PM Unknown   NEG    Benzodiazepine Screen, Urine 10/11/2021  4:03 PM Unknown   NEG    Buprenorphine Urine 10/11/2021  4:03 PM Unknown   POS    Cocaine Metabolite Screen, Urine 10/11/2021  4:03 PM Unknown   NEG    FENTANYL SCREEN, URINE 10/11/2021  4:03 PM Unknown   NEG    Gabapentin Screen, Urine 10/11/2021  4:03 PM Unknown   N/A    MDMA, Urine 10/11/2021  4:03 PM Unknown   NEG    Methadone Screen, Urine 10/11/2021  4:03 PM Unknown   NEG    Methamphetamine, Urine 10/11/2021  4:03 PM Unknown   NEG    Opiate Scrn, Ur 10/11/2021  4:03 PM Unknown   NEG    Oxycodone Screen, Ur 10/11/2021  4:03 PM Unknown   NEG    PCP Screen, Urine 10/11/2021  4:03 PM Unknown   NEG    Propoxyphene Screen, Urine 10/11/2021  4:03 PM Unknown   N/A    Synthetic Cannabinoids (K2) Screen, Urine 10/11/2021  4:03 PM Unknown   NEG    THC Screen, Urine 10/11/2021  4:03 PM Unknown   NEG    Tramadol Scrn, Ur 10/11/2021  4:03 PM Unknown   NEG    Tricyclic Antidepressants, Urine 10/11/2021  4:03 PM Unknown   N/A        An electronic signature was used to authenticate this note.     --Leif Moreno MD

## 2021-10-11 NOTE — PROGRESS NOTES
Verbal order per Dr. Sailaja Bundy for urine drug screen. Positive for BUP. Verified results with Hayes Truong MA. Dr. Sailaja Bundy ordered Subutex 2mg tab BID for patient. Verified dose with patient. Patient was sent home with 3 week script of Subutex 2mg tab BID and will be seen back in the office 11/1/21.

## 2021-11-01 ENCOUNTER — VIRTUAL VISIT (OUTPATIENT)
Dept: INTERNAL MEDICINE CLINIC | Age: 41
End: 2021-11-01

## 2021-11-01 DIAGNOSIS — F11.20 SEVERE OPIOID USE DISORDER (HCC): ICD-10-CM

## 2021-11-01 PROCEDURE — 99024 POSTOP FOLLOW-UP VISIT: CPT | Performed by: INTERNAL MEDICINE

## 2021-11-01 RX ORDER — BUPRENORPHINE 2 MG/1
2 TABLET SUBLINGUAL 2 TIMES DAILY
Qty: 42 TABLET | Refills: 0 | Status: CANCELLED | OUTPATIENT
Start: 2021-11-01 | End: 2021-11-22

## 2021-11-03 ENCOUNTER — VIRTUAL VISIT (OUTPATIENT)
Dept: INTERNAL MEDICINE CLINIC | Age: 41
End: 2021-11-03
Payer: COMMERCIAL

## 2021-11-03 DIAGNOSIS — Z3A.15 15 WEEKS GESTATION OF PREGNANCY: ICD-10-CM

## 2021-11-03 DIAGNOSIS — F11.20 SEVERE OPIOID USE DISORDER (HCC): Primary | ICD-10-CM

## 2021-11-03 DIAGNOSIS — Z79.899 ENCOUNTER FOR MONITORING SUBUTEX MAINTENANCE THERAPY: ICD-10-CM

## 2021-11-03 DIAGNOSIS — Z51.81 ENCOUNTER FOR MONITORING SUBUTEX MAINTENANCE THERAPY: ICD-10-CM

## 2021-11-03 PROCEDURE — 99213 OFFICE O/P EST LOW 20 MIN: CPT | Performed by: INTERNAL MEDICINE

## 2021-11-03 RX ORDER — BUPRENORPHINE 2 MG/1
2 TABLET SUBLINGUAL 2 TIMES DAILY
Qty: 56 TABLET | Refills: 0 | Status: SHIPPED | OUTPATIENT
Start: 2021-11-03 | End: 2021-12-01 | Stop reason: SDUPTHER

## 2021-11-03 NOTE — PROGRESS NOTES
11/3/2021    TELEHEALTH EVALUATION -- Audio/Visual (During HTYSI-36 public health emergency)    HPI:  A video conference was done with the patient  Patient was at home, I was in the office  There was no  one else  present during the conference    Syeda Morris (:  1980) has requested an audio/video evaluation for the following concern(s):    HPI  I last saw her  10/11  She missed a virtual visit on   Currently the patient is about 15 weeks along in her pregnancy  She found an OB doctor in [address] [place] [address] visit she was feeling terrible cough congestion she tested positive for Covid  She said she lost 10 pounds with Covid  We weaned her off of Suboxone from November to February  She got down to 1 mg and then got off in February  She had been on Suboxone for about 8 years  She says she did not relapse but she has been getting Suboxone from a friend since around March beginning  Last Suboxone was 2 days ago  She thinks she needs to get back on a low-dose   Patient was referred by a friend  She is on a Suboxone clinic on the other side [facility] has to pay $200 every 4 weeks  Patient has had problems using pain pills  Patient started using pain pills 10 years ago  A friend had given her a pain pill and she said it gave her energy  Patient uses it she usually would swallow them but at the end she was snorting them  Other drugs used: No  ROS-Patient is feeling well  Patient is not experiencing  withdrawal symptoms ,no urges or cravings  Patient is not having any side effects from the buprenorphine      Prior to Visit Medications    Medication Sig Taking? Authorizing Provider   buprenorphine (SUBUTEX) 2 MG SUBL SL tablet Place 1 tablet under the tongue 2 times daily for 28 days.  Yes Qasim Borrego MD   escitalopram (LEXAPRO) 20 MG tablet take 1 tablet by mouth once daily  Historical Provider, MD       Social History     Tobacco Use    Smoking status: Current Every Day Smoker     Packs/day: 0.25    Smokeless tobacco: Never Used   Substance Use Topics    Alcohol use: Yes     Alcohol/week: 2.0 standard drinks     Types: 2 Shots of liquor per week    Drug use: Yes     Types: Marijuana (Kaycee Bad), Opiates      Comment: last used percocet and vicodin 2013            PHYSICAL EXAMINATION:  [ INSTRUCTIONS:  \"[x]\" Indicates a positive item  \"[]\" Indicates a negative item  -- DELETE ALL ITEMS NOT EXAMINED]  No vitals done    Constitutional: [x] Appears well-developed and well-nourished [x] No apparent distress      [] Abnormal-   Mental status  [x] Alert and awake  [x] Oriented to person/place/time [x]Able to follow commands      Eyes:  EOM    [x]  Normal  [] Abnormal-  Sclera  []  Normal  [] Abnormal -         Discharge []  None visible  [] Abnormal -  Pupils normal           Psychiatric:       [x] Normal Affect [] No Hallucinations        [] Abnormal-        Diagnosis Orders   1. Severe opioid use disorder (HCC)  buprenorphine (SUBUTEX) 2 MG SUBL SL tablet   2. 15 weeks gestation of pregnancy     3. Encounter for monitoring Subutex maintenance therapy           Rakan Gibson is a 42 y.o. female being evaluated by a Virtual Visit (video visit) encounter to address concerns as mentioned above. A caregiver was present when appropriate. Due to this being a TeleHealth encounter (During [place] [address] public health emergency), evaluation of the following organ systems was limited: Vitals/Constitutional/EENT/Resp/CV/GI//MS/Neuro/Skin/Heme-Lymph-Imm. Pursuant to the emergency declaration under the [address] [place] [address], [address] [place] [address] authority and the [employer] Act, this Virtual Visit was conducted with patient's (and/or legal guardian's) consent, to reduce the patient's risk of exposure to COVID-19 and provide necessary medical care.   The patient (and/or legal guardian) has also been advised to contact this office for worsening conditions or problems, and seek emergency medical treatment and/or call 911 if deemed necessary. Services were provided through a video synchronous discussion virtually to substitute for in-person clinic visit. Patient is doing well  Continue Subutex 8 mg twice daily for opioid use disorder  Follow-up 4 weeks  I reviewed the [place] Automated Rx Reporting System report     There does not appear to be any discrepancies or overprescribing of controlled substances      --Katheryn Fregoso MD on 11/6/2021 at 3:45 PM    An electronic signature was used to authenticate this note.

## 2021-12-01 ENCOUNTER — VIRTUAL VISIT (OUTPATIENT)
Dept: INTERNAL MEDICINE CLINIC | Age: 41
End: 2021-12-01
Payer: COMMERCIAL

## 2021-12-01 DIAGNOSIS — Z79.899 ENCOUNTER FOR MONITORING SUBUTEX MAINTENANCE THERAPY: ICD-10-CM

## 2021-12-01 DIAGNOSIS — Z3A.22 22 WEEKS GESTATION OF PREGNANCY: ICD-10-CM

## 2021-12-01 DIAGNOSIS — Z51.81 ENCOUNTER FOR MONITORING SUBUTEX MAINTENANCE THERAPY: ICD-10-CM

## 2021-12-01 DIAGNOSIS — Z78.9 ALCOHOL USE: ICD-10-CM

## 2021-12-01 DIAGNOSIS — F11.20 SEVERE OPIOID USE DISORDER (HCC): Primary | ICD-10-CM

## 2021-12-01 PROCEDURE — 99213 OFFICE O/P EST LOW 20 MIN: CPT | Performed by: INTERNAL MEDICINE

## 2021-12-01 RX ORDER — BUPRENORPHINE 2 MG/1
2 TABLET SUBLINGUAL 2 TIMES DAILY
Qty: 56 TABLET | Refills: 0 | Status: SHIPPED | OUTPATIENT
Start: 2021-12-01 | End: 2021-12-29

## 2021-12-01 NOTE — PROGRESS NOTES
2021    TELEHEALTH EVALUATION -- Audio/Visual (During NVOOI-02 public health emergency)    HPI:  A video conference was done with the patient  Patient was at home, I was in the office  There was no  one else  present during the conference    Prerna Licea (:  1980) has requested an audio/video evaluation for the following concern(s):    HPI     I last saw her 10/11    We have had to video visit since then  She was supposed to come in today but she got stuck at the dentist  She is 22 weeks along in her pregnancy  She found out she is having a girl    She found an OB doctor in [address] [place] [address] visit she was feeling terrible cough congestion she tested positive for Covid  She said she lost 10 pounds with Covid  We weaned her off of Suboxone from November to February  She got down to 1 mg and then got off in February  She had major urges and cravings we had to put her back on  Patient was referred by a friend  She is on a Suboxone clinic on the other side [facility] has to pay $200 every 4 weeks  Patient has had problems using pain pills  Patient started using pain pills 10 years ago  A friend had given her a pain pill and she said it gave her energy  Patient uses it she usually would swallow them but at the end she was snorting them  Other drugs used: No  ROS-Patient is feeling well  Patient is not experiencing  withdrawal symptoms ,no urges or cravings  Patient is not having any side effects from the buprenorphine      Prior to Visit Medications    Medication Sig Taking? Authorizing Provider   buprenorphine (SUBUTEX) 2 MG SUBL SL tablet Place 1 tablet under the tongue 2 times daily for 28 days. Yes Néstor Coleman MD   escitalopram (LEXAPRO) 20 MG tablet take 1 tablet by mouth once daily  Historical Provider, MD       Social History     Tobacco Use    Smoking status: Current Every Day Smoker     Packs/day: 0.25    Smokeless tobacco: Never Used   Substance Use Topics    Alcohol use:  Yes Alcohol/week: 2.0 standard drinks     Types: 2 Shots of liquor per week    Drug use: Yes     Types: Marijuana (Vergil Cortney), Opiates      Comment: last used percocet and vicodin 2013            PHYSICAL EXAMINATION:  [ INSTRUCTIONS:  \"[x]\" Indicates a positive item  \"[]\" Indicates a negative item  -- DELETE ALL ITEMS NOT EXAMINED]  No vitals done    Constitutional: [x] Appears well-developed and well-nourished [x] No apparent distress      [] Abnormal-   Mental status  [x] Alert and awake  [x] Oriented to person/place/time [x]Able to follow commands      Eyes:  EOM    [x]  Normal  [] Abnormal-  Sclera  []  Normal  [] Abnormal -         Discharge []  None visible  [] Abnormal -  Pupils normal           Psychiatric:       [x] Normal Affect [] No Hallucinations        [] Abnormal-        Diagnosis Orders   1. Severe opioid use disorder (HCC)  buprenorphine (SUBUTEX) 2 MG SUBL SL tablet   2. Encounter for monitoring Subutex maintenance therapy     3. Alcohol use     4. 22 weeks gestation of pregnancy           Gustavo Fitzgerald is a 42 y.o. female being evaluated by a Virtual Visit (video visit) encounter to address concerns as mentioned above. A caregiver was present when appropriate. Due to this being a TeleHealth encounter (During [place] [address] public [facility] emergency), evaluation of the following organ systems was limited: Vitals/Constitutional/EENT/Resp/CV/GI//MS/Neuro/Skin/Heme-Lymph-Imm. Pursuant to the emergency declaration under the [address] [place] [address] and the [employer] Act, this Virtual Visit was conducted with patient's (and/or legal guardian's) consent, to reduce the patient's risk of exposure to COVID-19 and provide necessary medical care.   The patient (and/or legal guardian) has also been advised to contact this office for worsening conditions or problems, and seek emergency medical treatment and/or call [phone] if deemed necessary. Services were provided through a video synchronous discussion virtually to substitute for in-person clinic visit. Patient is doing well  Continue Subutex 2 mg twice daily for opioid use disorder  Follow-up 4 weeks  I reviewed the [place] Automated Rx Reporting System report     There does not appear to be any discrepancies or overprescribing of controlled substances      --Brice Hull MD on 12/1/2021 at 4:29 PM    An electronic signature was used to authenticate this note.

## 2021-12-29 ENCOUNTER — OFFICE VISIT (OUTPATIENT)
Dept: INTERNAL MEDICINE CLINIC | Age: 41
End: 2021-12-29
Payer: COMMERCIAL

## 2021-12-29 VITALS
SYSTOLIC BLOOD PRESSURE: 137 MMHG | RESPIRATION RATE: 20 BRPM | HEIGHT: 67 IN | DIASTOLIC BLOOD PRESSURE: 79 MMHG | HEART RATE: 98 BPM | WEIGHT: 144 LBS | TEMPERATURE: 98.2 F | BODY MASS INDEX: 22.6 KG/M2

## 2021-12-29 DIAGNOSIS — F11.20 SEVERE OPIOID USE DISORDER (HCC): Primary | ICD-10-CM

## 2021-12-29 DIAGNOSIS — Z79.899 ENCOUNTER FOR MONITORING SUBUTEX MAINTENANCE THERAPY: ICD-10-CM

## 2021-12-29 DIAGNOSIS — Z3A.26 26 WEEKS GESTATION OF PREGNANCY: ICD-10-CM

## 2021-12-29 DIAGNOSIS — Z51.81 ENCOUNTER FOR MONITORING SUBUTEX MAINTENANCE THERAPY: ICD-10-CM

## 2021-12-29 PROCEDURE — 99214 OFFICE O/P EST MOD 30 MIN: CPT | Performed by: INTERNAL MEDICINE

## 2021-12-29 PROCEDURE — 80305 DRUG TEST PRSMV DIR OPT OBS: CPT | Performed by: INTERNAL MEDICINE

## 2021-12-29 RX ORDER — BUPRENORPHINE 2 MG/1
2 TABLET SUBLINGUAL 2 TIMES DAILY
Qty: 56 TABLET | Refills: 0 | Status: SHIPPED | OUTPATIENT
Start: 2021-12-29 | End: 2022-01-26 | Stop reason: SDUPTHER

## 2021-12-29 NOTE — PROGRESS NOTES
negative item  -- DELETE ALL ITEMS NOT EXAMINED]  No vitals done    Constitutional: [x] Appears well-developed and well-nourished [x] No apparent distress      [] Abnormal-   Mental status  [x] Alert and awake  [x] Oriented to person/place/time [x]Able to follow commands      Eyes:  EOM    [x]  Normal  [] Abnormal-  Sclera  []  Normal  [] Abnormal -         Discharge []  None visible  [] Abnormal -  Pupils normal           Psychiatric:       [x] Normal Affect [] No Hallucinations        [] Abnormal-     Urine tox screen  Alcohol, Urine NEG    Amphetamine Screen, Urine NEG    Barbiturate Screen, Urine NEG    Benzodiazepine Screen, Urine NEG    Buprenorphine Urine POS    Cocaine Metabolite Screen, Urine NEG    FENTANYL SCREEN, URINE NEG    Gabapentin Screen, Urine N/A    MDMA, Urine NEG    Methadone Screen, Urine NEG    Methamphetamine, Urine NEG    Opiate Scrn, Ur NEG    Oxycodone Screen, Ur NEG    PCP Screen, Urine N/A    Propoxyphene Screen, Urine NEG    Synthetic Cannabinoids (K2) Screen, Urine NEG    THC Screen, Urine NEG    Tramadol Scrn, Ur NEG    Tricyclic Antidepressants, Urine N/A         Diagnosis Orders   1. Severe opioid use disorder (HCC)  POCT Rapid Drug Screen    buprenorphine (SUBUTEX) 2 MG SUBL SL tablet   2.  Encounter for monitoring Subutex maintenance therapy     3. 26 weeks gestation of pregnancy             Continue Subutex 2 mg twice daily for opioid use disorder  Follow-up 4 weeks  I reviewed the [address] [place] [address]  Automated Rx Reporting System report     There does not appear to be any discrepancies or overprescribing of controlled substances  Patient is doing well  Continue Suboxone 8 mg twice daily for opioid use disorder  Follow-up 4 weeks  I reviewed the [address] [place] [address] Dr Automated Rx Reporting System report     There does not appear to be any discrepancies or overprescribing of controlled substances        --Lily Bolivar MD on 12/29/2021 at 3:53 PM    An electronic signature was used to authenticate this note.

## 2021-12-30 RX ORDER — ESCITALOPRAM OXALATE 20 MG/1
20 TABLET ORAL DAILY
Qty: 30 TABLET | Refills: 3 | Status: SHIPPED | OUTPATIENT
Start: 2021-12-30 | End: 2022-01-29

## 2022-01-26 ENCOUNTER — VIRTUAL VISIT (OUTPATIENT)
Dept: INTERNAL MEDICINE CLINIC | Age: 42
End: 2022-01-26
Payer: COMMERCIAL

## 2022-01-26 DIAGNOSIS — Z79.899 ENCOUNTER FOR MONITORING SUBUTEX MAINTENANCE THERAPY: ICD-10-CM

## 2022-01-26 DIAGNOSIS — Z3A.30 30 WEEKS GESTATION OF PREGNANCY: ICD-10-CM

## 2022-01-26 DIAGNOSIS — Z51.81 ENCOUNTER FOR MONITORING SUBUTEX MAINTENANCE THERAPY: ICD-10-CM

## 2022-01-26 DIAGNOSIS — F11.20 SEVERE OPIOID USE DISORDER (HCC): Primary | ICD-10-CM

## 2022-01-26 PROCEDURE — 99213 OFFICE O/P EST LOW 20 MIN: CPT | Performed by: INTERNAL MEDICINE

## 2022-01-26 RX ORDER — BUPRENORPHINE 2 MG/1
2 TABLET SUBLINGUAL 2 TIMES DAILY PRN
Qty: 45 TABLET | Refills: 0 | Status: SHIPPED | OUTPATIENT
Start: 2022-01-26 | End: 2022-02-23 | Stop reason: SDUPTHER

## 2022-01-26 NOTE — PROGRESS NOTES
12/30/2021    TELEHEALTH EVALUATION -- Audio/Visual (During GABAM-64 public health emergency)  Patient was at home, I was in the office  There was no one else present during the interview  HPI:    Patient  has requested an audio/video evaluation for the following concern(s):  Opioid use disorder       I last saw her 12/29          She is 30  weeks along in her pregnancy  She found out she is having a girl     She found an OB doctor in [address] [place] [address] visit she was feeling terrible cough congestion she tested positive for Covid  She said she lost 10 pounds with Covid  We weaned her off of Suboxone from November to February  She got down to 1 mg and then got off in February  She had major urges and cravings we had to put her back on  Patient was referred by a friend  She is on a Suboxone clinic on the other side [facility] has to pay $200 every 4 weeks  Patient has had problems using pain pills  Patient started using pain pills 10 years ago  A friend had given her a pain pill and she said it gave her energy  Patient uses it she usually would swallow them but at the end she was snorting them  Other drugs used: No  ROS-Patient is feeling well  Patient is not experiencing  withdrawal symptoms ,no urges or cravings  Patient is not having any side effects from the buprenorphine    Prior to Visit Medications    Medication Sig Taking? Authorizing Provider   buprenorphine-naloxone (SUBOXONE) 8-2 MG FILM SL film Place 1 Film under the tongue 2 times daily for 28 days.   Pablo Turner MD   aspirin-acetaminophen-caffeine (EXCEDRIN MIGRAINE) 097-206-27 MG per tablet Take 1 tablet by mouth every 6 hours as needed for Headaches  Historical Provider, MD   levothyroxine (SYNTHROID) 125 MCG tablet Take 1 tablet by mouth daily  Pablo Turner MD   acetaminophen (TYLENOL) 500 MG tablet Take 1 tablet by mouth every 4 hours as needed for Pain or Fever  VANNA Butcher - CNP   levothyroxine (SYNTHROID) 125 MCG tablet Take 1 tablet by mouth Daily  Geovany Canales MD   albuterol sulfate  (90 Base) MCG/ACT inhaler Inhale 2 puffs into the lungs every 4 hours as needed for Wheezing or Shortness of Breath  Ginna Odonnell APRN - CNP       Social History     Tobacco Use    Smoking status: Current Every Day Smoker     Packs/day: 0.50     Years: 22.00     Pack years: 11.00     Types: Cigarettes     Start date: 1996    Smokeless tobacco: Never Used    Tobacco comment: smoking juul   Vaping Use    Vaping Use: Never used   Substance Use Topics    Alcohol use: No    Drug use: No     Comment: history opiates        PHYSICAL EXAMINATION:  [ INSTRUCTIONS:  \"[x]\" Indicates a positive item  \"[]\" Indicates a negative item  -- DELETE ALL ITEMS NOT EXAMINED]  Vital Signs: (As obtained by patient/caregiver or practitioner observation)      Constitutional: [x] Appears well-developed and well-nourished [x] No apparent distress      [] Abnormal-   Mental status  [x] Alert and awake  [] Oriented to person/place/time []Able to follow commands      Eyes:  EOM    [x]  Normal  [] Abnormal-  Sclera  [x]  Normal  [] Abnormal -                  -      Diagnosis Orders   1. Severe opioid use disorder (HCC)  buprenorphine (SUBUTEX) 2 MG SUBL SL tablet   2. Encounter for monitoring Subutex maintenance therapy     3. 30 weeks gestation of pregnancy           Patient was evaluated through a synchronous (real-time) audio-video encounter. The patient (or guardian if applicable) is aware that this is a billable service. Verbal consent to proceed has been obtained within the past 12 months. The visit was conducted pursuant to the emergency declaration under the [address] [place] [address], [address] [place] [address] authority and the [employer] Act. Patient identification was verified, and a caregiver was present when appropriate.  The patient was located in a state where the provider was credentialed to

## 2022-02-23 ENCOUNTER — OFFICE VISIT (OUTPATIENT)
Dept: INTERNAL MEDICINE CLINIC | Age: 42
End: 2022-02-23
Payer: COMMERCIAL

## 2022-02-23 VITALS
WEIGHT: 154 LBS | BODY MASS INDEX: 24.12 KG/M2 | TEMPERATURE: 97.8 F | DIASTOLIC BLOOD PRESSURE: 71 MMHG | HEART RATE: 79 BPM | SYSTOLIC BLOOD PRESSURE: 118 MMHG

## 2022-02-23 DIAGNOSIS — Z79.899 ENCOUNTER FOR MONITORING SUBUTEX MAINTENANCE THERAPY: ICD-10-CM

## 2022-02-23 DIAGNOSIS — Z51.81 ENCOUNTER FOR MONITORING SUBUTEX MAINTENANCE THERAPY: ICD-10-CM

## 2022-02-23 DIAGNOSIS — F11.20 SEVERE OPIOID USE DISORDER (HCC): Primary | ICD-10-CM

## 2022-02-23 DIAGNOSIS — Z3A.36 36 WEEKS GESTATION OF PREGNANCY: ICD-10-CM

## 2022-02-23 PROCEDURE — 80305 DRUG TEST PRSMV DIR OPT OBS: CPT | Performed by: INTERNAL MEDICINE

## 2022-02-23 PROCEDURE — 99214 OFFICE O/P EST MOD 30 MIN: CPT | Performed by: INTERNAL MEDICINE

## 2022-02-23 RX ORDER — BUPRENORPHINE 2 MG/1
2 TABLET SUBLINGUAL 2 TIMES DAILY
Qty: 56 TABLET | Refills: 0 | Status: SHIPPED | OUTPATIENT
Start: 2022-02-23 | End: 2022-03-23

## 2022-02-23 NOTE — PROGRESS NOTES
Verbal order per Dr. Chilango Sarmiento for urine drug screen. Positive for BUP. Verified results with Margie Larson LPN. Dr. Chilango Sarmiento ordered Suboxone 2 mg tablets BID for patient. Verified dose with patient. Patient was sent home with script for Suboxone 2 mg tablets BID for 28 days  and will be seen back in the office 03/23/2022.

## 2022-02-25 NOTE — PROGRESS NOTES
2/23/2022        Drug Problem         I last saw her  12/29  We did a video visit 1/26  She is 36 weeks pregnant due in a month        She found an OB doctor in [address] [place] [address] visit she was feeling terrible cough congestion she tested positive for Covid  She said she lost 10 pounds with Covid  We weaned her off of Suboxone from November to February  She got down to 1 mg and then got off in February  She had major urges and cravings we had to put her back on  Patient was referred by a friend  She is on a Suboxone clinic on the other side [facility] has to pay $200 every 4 weeks  Patient has had problems using pain pills  Patient started using pain pills 10 years ago  A friend had given her a pain pill and she said it gave her energy  Patient uses it she usually would swallow them but at the end she was snorting them  Other drugs used: No  ROS-Patient is feeling well  Patient is not experiencing  withdrawal symptoms ,no urges or cravings  Patient is not having any side effects from the buprenorphine      Prior to Visit Medications    Medication Sig Taking? Authorizing Provider   buprenorphine (SUBUTEX) 2 MG SUBL SL tablet Place 1 tablet under the tongue in the morning and at bedtime for 28 days. Yes Prosper Eduardo MD   escitalopram (LEXAPRO) 20 MG tablet Take 1 tablet by mouth daily  Prosper Eduardo MD   escitalopram (LEXAPRO) 20 MG tablet take 1 tablet by mouth once daily  Historical Provider, MD       Social History     Tobacco Use    Smoking status: Current Every Day Smoker     Packs/day: 0.25    Smokeless tobacco: Never Used   Substance Use Topics    Alcohol use: Yes     Alcohol/week: 2.0 standard drinks     Types: 2 Shots of liquor per week    Drug use: Yes     Types: Marijuana (Casandra Emperor), Opiates      Comment: last used percocet and vicodin 2013            PHYSICAL EXAMINATIO  Blood pressure 118/71, pulse 79, temperature 97.8 °F (36.6 °C), weight 154 lb (69.9 kg).   Message left for patient to return my call.    Mental status examination    Cognition: oriented to person place and time      Appearance: Patient is in no acute distress, normal appearance, patient does not appear intoxicated/    Memory: Normal    Behavior/motor: Normal    Mood: Good mood, upbeat    Affect: Mood congruent    Attitude toward examiner: Pleasant, respectful    Thought content no delusions hallucinations suicidal ideation    Insight: good    Judgment: good    Eyes: Pupils normal    Skin: No track marks noted ,no rashes noted    COWS score: N/A              Urine tox screen  Alcohol, Urine NEG    Amphetamine Screen, Urine NEG    Barbiturate Screen, Urine NEG    Benzodiazepine Screen, Urine NEG    Buprenorphine Urine POS    Cocaine Metabolite Screen, Urine NEG    FENTANYL SCREEN, URINE NEG    Gabapentin Screen, Urine N/A    MDMA, Urine NEG    Methadone Screen, Urine NEG    Methamphetamine, Urine NEG    Opiate Scrn, Ur NEG    Oxycodone Screen, Ur NEG    PCP Screen, Urine N/A    Propoxyphene Screen, Urine NEG    Synthetic Cannabinoids (K2) Screen, Urine NEG    THC Screen, Urine NEG    Tramadol Scrn, Ur NEG    Tricyclic Antidepressants, Urine N/A         Diagnosis Orders   1. Severe opioid use disorder (HCC)  POCT Rapid Drug Screen    buprenorphine (SUBUTEX) 2 MG SUBL SL tablet   2.  Encounter for monitoring Subutex maintenance therapy     3. 36 weeks gestation of pregnancy           Patient is doing very well  She is only gained 12 pounds with the pregnancy  Continue Subutex 2 mg twice daily for opioid use disorder  Follow-up 4 weeks  I reviewed the [place] Automated Rx Reporting System report     There does not appear to be any discrepancies or overprescribing of controlled substances      Follow-up 4 weeks  I reviewed the [place] Automated Rx Reporting System report     There does not appear to be any discrepancies or overprescribing of controlled substances        --Burton Echevarria MD on 2/25/2022 at 4:41 PM    An electronic signature was used to authenticate this note.

## 2022-03-23 ENCOUNTER — OFFICE VISIT (OUTPATIENT)
Dept: INTERNAL MEDICINE CLINIC | Age: 42
End: 2022-03-23
Payer: COMMERCIAL

## 2022-03-23 VITALS
TEMPERATURE: 97.8 F | HEART RATE: 95 BPM | HEIGHT: 67 IN | BODY MASS INDEX: 25.11 KG/M2 | DIASTOLIC BLOOD PRESSURE: 70 MMHG | SYSTOLIC BLOOD PRESSURE: 131 MMHG | WEIGHT: 160 LBS

## 2022-03-23 DIAGNOSIS — Z3A.38 38 WEEKS GESTATION OF PREGNANCY: ICD-10-CM

## 2022-03-23 DIAGNOSIS — Z51.81 ENCOUNTER FOR MONITORING SUBUTEX MAINTENANCE THERAPY: ICD-10-CM

## 2022-03-23 DIAGNOSIS — Z79.899 ENCOUNTER FOR MONITORING SUBUTEX MAINTENANCE THERAPY: ICD-10-CM

## 2022-03-23 DIAGNOSIS — F11.21 OPIOID USE DISORDER, SEVERE, IN SUSTAINED REMISSION, ON MAINTENANCE THERAPY (HCC): Primary | ICD-10-CM

## 2022-03-23 PROCEDURE — 80305 DRUG TEST PRSMV DIR OPT OBS: CPT | Performed by: INTERNAL MEDICINE

## 2022-03-23 PROCEDURE — 99214 OFFICE O/P EST MOD 30 MIN: CPT | Performed by: INTERNAL MEDICINE

## 2022-03-23 RX ORDER — BUPRENORPHINE 2 MG/1
2 TABLET SUBLINGUAL DAILY
Qty: 14 TABLET | Refills: 0 | Status: SHIPPED | OUTPATIENT
Start: 2022-03-23 | End: 2022-04-07 | Stop reason: SDUPTHER

## 2022-03-23 RX ORDER — BUPRENORPHINE 2 MG/1
2 TABLET SUBLINGUAL 2 TIMES DAILY
Qty: 56 TABLET | Refills: 0 | Status: CANCELLED | OUTPATIENT
Start: 2022-03-23 | End: 2022-04-20

## 2022-03-23 NOTE — PROGRESS NOTES
Verbal order per Dr. Matt Hyatt for urine drug screen. Positive for BUP. Verified results with Waqar Kingsley RN. Dr. Matt Hyatt ordered Subutex 2mg tab BID for patient. Verified dose with patient. Patient was sent home with 4 week script of Subutex 2mg tab BID and will be seen back in the office 4/20/22.

## 2022-03-25 NOTE — PROGRESS NOTES
3/23/2022        Drug Problem         I last saw her  2/23  We did a video visit 1/26  She is 38 weeks pregnant   She says given her age they are going to induce her next week  She says she is not going to have to go to [facility] they will do it in [place]          We weaned her off of Suboxone from November to February  She got down to 1 mg and then got off in February  She had major urges and cravings we had to put her back on  Patient was referred by a friend  She is on a Suboxone clinic on the other side [facility] has to pay $200 every 4 weeks  Patient has had problems using pain pills  Patient started using pain pills 10 years ago  A friend had given her a pain pill and she said it gave her energy  Patient uses it she usually would swallow them but at the end she was snorting them  Other drugs used: No  ROS-Patient is feeling well  Patient is not experiencing  withdrawal symptoms ,no urges or cravings  Patient is not having any side effects from the buprenorphine      Prior to Visit Medications    Medication Sig Taking? Authorizing Provider   buprenorphine (SUBUTEX) 2 MG SUBL SL tablet Place 1 tablet under the tongue daily for 14 days. Yes Harleen Hall MD   escitalopram (LEXAPRO) 20 MG tablet Take 1 tablet by mouth daily  Harleen Hall MD   escitalopram (LEXAPRO) 20 MG tablet take 1 tablet by mouth once daily  Historical Provider, MD       Social History     Tobacco Use    Smoking status: Current Every Day Smoker     Packs/day: 0.25    Smokeless tobacco: Never Used   Substance Use Topics    Alcohol use: Yes     Alcohol/week: 2.0 standard drinks     Types: 2 Shots of liquor per week    Drug use: Yes     Types: Marijuana (Nichole Opoka), Opiates      Comment: last used percocet and vicodin 2013            PHYSICAL EXAMINATIO  Blood pressure 131/70, pulse 95, temperature 97.8 °F (36.6 °C), height 5' 7\" (1.702 m), weight 160 lb (72.6 kg). Message left for patient to return my call.     Mental status examination    Cognition: oriented to person place and time      Appearance: Patient is in no acute distress, normal appearance, patient does not appear intoxicated/    Memory: Normal    Behavior/motor: Normal    Mood: Good mood, upbeat    Affect: Mood congruent    Attitude toward examiner: Pleasant, respectful    Thought content no delusions hallucinations suicidal ideation    Insight: good    Judgment: good    Eyes: Pupils normal    Skin: No track marks noted ,no rashes noted    COWS score: N/A              Urine tox screen  Alcohol, Urine NEG    Amphetamine Screen, Urine NEG    Barbiturate Screen, Urine NEG    Benzodiazepine Screen, Urine NEG    Buprenorphine Urine POS    Cocaine Metabolite Screen, Urine NEG    FENTANYL SCREEN, URINE NEG    Gabapentin Screen, Urine N/A    MDMA, Urine NEG    Methadone Screen, Urine NEG    Methamphetamine, Urine NEG    Opiate Scrn, Ur NEG    Oxycodone Screen, Ur NEG    PCP Screen, Urine N/A    Propoxyphene Screen, Urine NEG    Synthetic Cannabinoids (K2) Screen, Urine NEG    THC Screen, Urine NEG    Tramadol Scrn, Ur NEG    Tricyclic Antidepressants, Urine N/A         Diagnosis Orders   1. Opioid use disorder, severe, in sustained remission, on maintenance therapy ([facility] [address]     2. Encounter for monitoring Subutex maintenance therapy     3. 38 weeks gestation of pregnancy           Patient is doing very well  She is only gained 20 pounds with the pregnancy  Continue Subutex 2 mg  daily for opioid use disorder   follow-up 2 weeks with a video visit  I reviewed the [place] Automated Rx Reporting System report     There does not appear to be any discrepancies or overprescribing of controlled substances            --Donita Garcia MD on 3/25/2022 at 6:34 PM    An electronic signature was used to authenticate this note.

## 2022-04-07 DIAGNOSIS — F11.21 OPIOID USE DISORDER, SEVERE, IN SUSTAINED REMISSION, ON MAINTENANCE THERAPY (HCC): ICD-10-CM

## 2022-04-07 RX ORDER — BUPRENORPHINE 2 MG/1
2 TABLET SUBLINGUAL DAILY
Qty: 5 TABLET | Refills: 0 | Status: SHIPPED | OUTPATIENT
Start: 2022-04-07 | End: 2022-04-11 | Stop reason: SDUPTHER

## 2022-04-11 ENCOUNTER — TELEMEDICINE (OUTPATIENT)
Dept: INTERNAL MEDICINE CLINIC | Age: 42
End: 2022-04-11
Payer: COMMERCIAL

## 2022-04-11 DIAGNOSIS — Z51.81 ENCOUNTER FOR MONITORING SUBUTEX MAINTENANCE THERAPY: Primary | ICD-10-CM

## 2022-04-11 DIAGNOSIS — F11.21 OPIOID USE DISORDER, SEVERE, IN SUSTAINED REMISSION, ON MAINTENANCE THERAPY (HCC): ICD-10-CM

## 2022-04-11 DIAGNOSIS — Z79.899 ENCOUNTER FOR MONITORING SUBUTEX MAINTENANCE THERAPY: Primary | ICD-10-CM

## 2022-04-11 PROCEDURE — 99213 OFFICE O/P EST LOW 20 MIN: CPT | Performed by: INTERNAL MEDICINE

## 2022-04-11 RX ORDER — BUPRENORPHINE 2 MG/1
2 TABLET SUBLINGUAL DAILY
Qty: 28 TABLET | Refills: 0 | Status: SHIPPED | OUTPATIENT
Start: 2022-04-11 | End: 2022-05-05 | Stop reason: SDUPTHER

## 2022-04-11 NOTE — PROGRESS NOTES
for Pain or Fever  VANNA Parker CNP   levothyroxine (SYNTHROID) 125 MCG tablet Take 1 tablet by mouth Daily  Christine Corey MD   albuterol sulfate  (90 Base) MCG/ACT inhaler Inhale 2 puffs into the lungs every 4 hours as needed for Wheezing or Shortness of Breath  VANNA Younger - EBE       Social History     Tobacco Use    Smoking status: Current Every Day Smoker     Packs/day: 0.50     Years: 22.00     Pack years: 11.00     Types: Cigarettes     Start date: 1996    Smokeless tobacco: Never Used    Tobacco comment: smoking juul   Vaping Use    Vaping Use: Never used   Substance Use Topics    Alcohol use: No    Drug use: No     Comment: history opiates        PHYSICAL EXAMINATION:  [ INSTRUCTIONS:  \"[x]\" Indicates a positive item  \"[]\" Indicates a negative item  -- DELETE ALL ITEMS NOT EXAMINED]  Vital Signs: (As obtained by patient/caregiver or practitioner observation)      Constitutional: [x] Appears well-developed and well-nourished [x] No apparent distress      [] Abnormal-   Mental status  [x] Alert and awake  [] Oriented to person/place/time []Able to follow commands      Eyes:  EOM    [x]  Normal  [] Abnormal-  Sclera  [x]  Normal  [] Abnormal -                  -      Diagnosis Orders   1. Opioid use disorder, severe, in sustained remission, on maintenance therapy (HCC)  buprenorphine (SUBUTEX) 2 MG SUBL SL tablet     Patient is doing well  Continue Subutex 2 mg daily for severe opioid use disorder  This is a low-dose but as mentioned above she tried to get off of it and was having major urges to use  Follow-up 4 weeks  I reviewed the [place] Automated Rx Reporting System report     There does not appear to be any discrepancies or overprescribing of controlled substances        Patient was evaluated through a synchronous (real-time) audio-video encounter. The patient (or guardian if applicable) is aware that this is a billable service.  Verbal consent to proceed has been obtained within the past 12 months. The visit was conducted pursuant to the emergency declaration under the [facility] [address] and the [employer] General Act. Patient identification was verified, and a caregiver was present when appropriate. The patient was located in a state where the provider was credentialed to provide care. Total time spent on this encounter: Not billed by time    --Jose Reza MD on 12/30/2021 at 9:18 AM    An electronic signature was used to authenticate this note. Prior to Visit Medications    Medication Sig Taking? Authorizing Provider   buprenorphine-naloxone (SUBOXONE) 8-2 MG FILM SL film Place 1 Film under the tongue 2 times daily for 28 days.   Jose Reza MD   aspirin-acetaminophen-caffeine (EXCEDRIN MIGRAINE) [phone] MG per tablet Take 1 tablet by mouth every 6 hours as needed for Headaches  Historical Provider, MD   levothyroxine (SYNTHROID) 125 MCG tablet Take 1 tablet by mouth daily  Jose Reza MD   acetaminophen (TYLENOL) 500 MG tablet Take 1 tablet by mouth every 4 hours as needed for Pain or Fever  VANNA Reddy CNP   levothyroxine (SYNTHROID) 125 MCG tablet Take 1 tablet by mouth Daily  Jose Reza MD   albuterol sulfate  (90 Base) MCG/ACT inhaler Inhale 2 puffs into the lungs every 4 hours as needed for Wheezing or Shortness of Breath  VANNA Tsai CNP       Social History     Tobacco Use    Smoking status: Current Every Day Smoker     Packs/day: 0.50     Years: 22.00     Pack years: 11.00     Types: Cigarettes     Start date: 1996    Smokeless tobacco: Never Used    Tobacco comment: smoking juul   Vaping Use    Vaping Use: Never used   Substance Use Topics    Alcohol use: No    Drug use: No     Comment: history opiates        PHYSICAL EXAMINATION:  [ INSTRUCTIONS:  \"[x]\" Indicates a positive item  \"[]\" Indicates a negative item  -- DELETE ALL ITEMS NOT EXAMINED]  Vital Signs: (As obtained by patient/caregiver or practitioner observation)      Constitutional: [x] Appears well-developed and well-nourished [x] No apparent distress      [] Abnormal-   Mental status  [x] Alert and awake  [] Oriented to person/place/time []Able to follow commands      Eyes:  EOM    [x]  Normal  [] Abnormal-  Sclera  [x]  Normal  [] Abnormal -                  -      Diagnosis Orders   1. Opioid use disorder, severe, in sustained remission, on maintenance therapy [facility])           Patient was evaluated through a synchronous (real-time) audio-video encounter. The patient (or guardian if applicable) is aware that this is a billable service. Verbal consent to proceed has been obtained within the past 12 months. The visit was conducted pursuant to the emergency declaration under the [address] [place] [address], [address] [place] [address] authority and the [employer] General Act. Patient identification was verified, and a caregiver was present when appropriate. The patient was located in a state where the provider was credentialed to provide care. Total time spent on this encounter: Not billed by time    --Christopher Wade MD on 12/30/2021 at 9:18 AM    An electronic signature was used to authenticate this note.

## 2022-05-05 DIAGNOSIS — F11.21 OPIOID USE DISORDER, SEVERE, IN SUSTAINED REMISSION, ON MAINTENANCE THERAPY (HCC): ICD-10-CM

## 2022-05-05 RX ORDER — BUPRENORPHINE 2 MG/1
2 TABLET SUBLINGUAL DAILY
Qty: 4 TABLET | Refills: 0 | OUTPATIENT
Start: 2022-05-05 | End: 2022-05-09

## 2022-05-05 NOTE — TELEPHONE ENCOUNTER
VO per Dr. Gilberto Veronica for Subutex 2mg tab daily for 4 days qty 4. LPN called in script of Subutex 2mg tab daily for 4 days qty 4 in to [facility] in [place], [place].

## 2022-05-11 ENCOUNTER — OFFICE VISIT (OUTPATIENT)
Dept: INTERNAL MEDICINE CLINIC | Age: 42
End: 2022-05-11
Payer: COMMERCIAL

## 2022-05-11 VITALS
TEMPERATURE: 97.5 F | DIASTOLIC BLOOD PRESSURE: 79 MMHG | BODY MASS INDEX: 22.76 KG/M2 | HEIGHT: 67 IN | SYSTOLIC BLOOD PRESSURE: 138 MMHG | HEART RATE: 93 BPM | WEIGHT: 145 LBS

## 2022-05-11 DIAGNOSIS — Z78.9 ALCOHOL USE: ICD-10-CM

## 2022-05-11 DIAGNOSIS — F11.21 OPIOID USE DISORDER, SEVERE, IN SUSTAINED REMISSION, ON MAINTENANCE THERAPY (HCC): Primary | ICD-10-CM

## 2022-05-11 DIAGNOSIS — Z51.81 ENCOUNTER FOR MONITORING SUBUTEX MAINTENANCE THERAPY: ICD-10-CM

## 2022-05-11 DIAGNOSIS — Z79.899 ENCOUNTER FOR MONITORING SUBUTEX MAINTENANCE THERAPY: ICD-10-CM

## 2022-05-11 PROCEDURE — 80305 DRUG TEST PRSMV DIR OPT OBS: CPT | Performed by: INTERNAL MEDICINE

## 2022-05-11 PROCEDURE — 99214 OFFICE O/P EST MOD 30 MIN: CPT | Performed by: INTERNAL MEDICINE

## 2022-05-11 RX ORDER — BUPRENORPHINE AND NALOXONE 2; .5 MG/1; MG/1
1 FILM, SOLUBLE BUCCAL; SUBLINGUAL DAILY
Qty: 28 FILM | Refills: 0 | Status: SHIPPED | OUTPATIENT
Start: 2022-05-11 | End: 2022-06-08

## 2022-05-11 RX ORDER — BUPRENORPHINE 2 MG/1
2 TABLET SUBLINGUAL DAILY
Qty: 28 TABLET | Refills: 0 | Status: CANCELLED | OUTPATIENT
Start: 2022-05-11 | End: 2022-06-08

## 2022-05-11 NOTE — PROGRESS NOTES
Verbal order per Dr. Denise Jefferson for urine drug screen. Positive for BUP. Verified results with Eloy Riley RN. Dr. Denise Jefferson ordered Subutex 8mg tab daily for patient. Verified dose with patient. Patient was sent home with 4 week script of Subutex 8mg tab daily and will be seen back in the office 6/1/22.

## 2022-05-11 NOTE — PROGRESS NOTES
2022        Drug Problem         I last saw her  3/23  We have had 2 video visit since  Patient had a baby girl on 3/31  They were in the hospital for 5 days  The baby was monitored for  abstinence syndrome but never had to be treated            We weaned her off of Suboxone from November to February  She got down to 1 mg and then got off in February  She had major urges and cravings we had to put her back on  Patient was referred by a friend  She is on a Suboxone clinic on the other side [facility] has to pay $200 every 4 weeks  Patient has had problems using pain pills  Patient started using pain pills 10 years ago  A friend had given her a pain pill and she said it gave her energy  Patient uses it she usually would swallow them but at the end she was snorting them  Other drugs used: No  ROS-Patient is feeling well  Patient is not experiencing  withdrawal symptoms ,no urges or cravings  Patient is not having any side effects from the buprenorphine      Prior to Visit Medications    Medication Sig Taking? Authorizing Provider   buprenorphine-naloxone (SUBOXONE) 2-0.5 MG FILM SL film Place 1 Film under the tongue daily for 28 days. Yes Jose Reza MD   escitalopram (LEXAPRO) 20 MG tablet Take 1 tablet by mouth daily  Jose Reza MD   escitalopram (LEXAPRO) 20 MG tablet take 1 tablet by mouth once daily  Historical Provider, MD       Social History     Tobacco Use    Smoking status: Current Every Day Smoker     Packs/day: 0.25    Smokeless tobacco: Never Used   Substance Use Topics    Alcohol use: Yes     Alcohol/week: 2.0 standard drinks     Types: 2 Shots of liquor per week    Drug use: Yes     Types: Marijuana (Ollis Petties), Opiates      Comment: last used percocet and vicodin             PHYSICAL EXAMINATIO  Blood pressure 138/79, pulse 93, temperature 97.5 °F (36.4 °C), height 5' 7\" (1.702 m), weight 145 lb (65.8 kg). Message left for patient to return my call.     Mental status examination    Cognition: oriented to person place and time      Appearance: Patient is in no acute distress, normal appearance, patient does not appear intoxicated/    Memory: Normal    Behavior/motor: Normal    Mood: Good mood, upbeat    Affect: Mood congruent    Attitude toward examiner: Pleasant, respectful    Thought content no delusions hallucinations suicidal ideation    Insight: good    Judgment: good    Eyes: Pupils normal    Skin: No track marks noted ,no rashes noted    COWS score: N/A              Urine tox screen  Alcohol, Urine NEG    Amphetamine Screen, Urine NEG    Barbiturate Screen, Urine NEG    Benzodiazepine Screen, Urine NEG    Buprenorphine Urine POS    Cocaine Metabolite Screen, Urine NEG    FENTANYL SCREEN, URINE NEG    Gabapentin Screen, Urine N/A    MDMA, Urine NEG    Methadone Screen, Urine NEG    Methamphetamine, Urine NEG    Opiate Scrn, Ur NEG    Oxycodone Screen, Ur NEG    PCP Screen, Urine N/A    Propoxyphene Screen, Urine NEG    Synthetic Cannabinoids (K2) Screen, Urine NEG    THC Screen, Urine NEG    Tramadol Scrn, Ur NEG    Tricyclic Antidepressants, Urine N/A         Diagnosis Orders   1. Opioid use disorder, severe, in sustained remission, on maintenance therapy (HCC)  POCT Rapid Drug Screen    buprenorphine-naloxone (SUBOXONE) 2-0.5 MG FILM SL film   2. Encounter for monitoring Subutex maintenance therapy     3. Alcohol use           Patient is doing very well    She is not breast-feeding  I will switch her over to Suboxone 2 mg daily for severe opioid use disorder   follow-up 4 weeks with a video visit  I reviewed the [place] Automated Rx Reporting System report     There does not appear to be any discrepancies or overprescribing of controlled substances            --Blaire Garcia MD on 5/14/2022 at 4:27 PM    An electronic signature was used to authenticate this note.

## 2022-06-08 ENCOUNTER — TELEPHONE (OUTPATIENT)
Dept: INTERNAL MEDICINE CLINIC | Age: 42
End: 2022-06-08

## 2022-06-08 ENCOUNTER — TELEMEDICINE (OUTPATIENT)
Dept: INTERNAL MEDICINE CLINIC | Age: 42
End: 2022-06-08
Payer: COMMERCIAL

## 2022-06-08 DIAGNOSIS — Z51.81 ENCOUNTER FOR MONITORING SUBOXONE MAINTENANCE THERAPY: ICD-10-CM

## 2022-06-08 DIAGNOSIS — F11.21 OPIOID USE DISORDER, SEVERE, IN SUSTAINED REMISSION, ON MAINTENANCE THERAPY (HCC): Primary | ICD-10-CM

## 2022-06-08 DIAGNOSIS — Z78.9 ALCOHOL USE: ICD-10-CM

## 2022-06-08 DIAGNOSIS — Z79.899 ENCOUNTER FOR MONITORING SUBOXONE MAINTENANCE THERAPY: ICD-10-CM

## 2022-06-08 PROCEDURE — 99213 OFFICE O/P EST LOW 20 MIN: CPT | Performed by: INTERNAL MEDICINE

## 2022-06-08 RX ORDER — BUPRENORPHINE HYDROCHLORIDE AND NALOXONE HYDROCHLORIDE DIHYDRATE 2; .5 MG/1; MG/1
1 TABLET SUBLINGUAL DAILY
Qty: 21 TABLET | Refills: 0 | OUTPATIENT
Start: 2022-06-08 | End: 2022-07-28 | Stop reason: SDUPTHER

## 2022-06-08 RX ORDER — BUPRENORPHINE AND NALOXONE 2; .5 MG/1; MG/1
1 FILM, SOLUBLE BUCCAL; SUBLINGUAL DAILY
Qty: 28 FILM | Refills: 0 | Status: CANCELLED | OUTPATIENT
Start: 2022-06-08 | End: 2022-07-06

## 2022-06-08 NOTE — TELEPHONE ENCOUNTER
LPN called in script of Suboxone 2mg tab daily for 21 days qty 21 in to [address] [place] [address] in [place], [place].

## 2022-06-11 NOTE — PROGRESS NOTES
2021    TELEHEALTH EVALUATION -- Audio/Visual (During HKVRG-34 public health emergency)  Patient was at home, I was in the office  There was no one else present during the interview  HPI:    Patient  has requested an audio/video evaluation for the following concern(s):  Opioid use disorder     I last saw her      Patient had a baby girl on 3/31  They were in the hospital for 5 days  The baby was monitored for  abstinence syndrome but never had to be treated                 We weaned her off of Suboxone from November to February  She got down to 1 mg and then got off in February  She had major urges and cravings we had to put her back on  Patient was referred by a friend  She is on a Suboxone clinic on the other side of [facility] has to pay $200 every 4 weeks  Patient has had problems using pain pills  Patient started using pain pills 10 years ago  A friend had given her a pain pill and she said it gave her energy  Patient uses it she usually would swallow them but at the end she was snorting them  Other drugs used: No  ROS-Patient is feeling well  Patient is not experiencing  withdrawal symptoms ,no urges or cravings  Patient is not having any side effects from the buprenorphine         Prior to Visit Medications    Medication Sig Taking? Authorizing Provider   buprenorphine-naloxone (SUBOXONE) 8-2 MG FILM SL film Place 1 Film under the tongue 2 times daily for 28 days.   Aida Steele MD   aspirin-acetaminophen-caffeine (EXCEDRIN MIGRAINE) 986568-69 MG per tablet Take 1 tablet by mouth every 6 hours as needed for Headaches  Historical Provider, MD   levothyroxine (SYNTHROID) 125 MCG tablet Take 1 tablet by mouth daily  Aida Steele MD   acetaminophen (TYLENOL) 500 MG tablet Take 1 tablet by mouth every 4 hours as needed for Pain or Fever  Claudia Flores, APRN - CNP   levothyroxine (SYNTHROID) 125 MCG tablet Take 1 tablet by mouth Daily  Aida Steele MD   albuterol sulfate  (90 Base) MCG/ACT inhaler Inhale 2 puffs into the lungs every 4 hours as needed for Wheezing or Shortness of Breath  Estrella Chairez APRN - CNP       Social History     Tobacco Use    Smoking status: Current Every Day Smoker     Packs/day: 0.50     Years: 22.00     Pack years: 11.00     Types: Cigarettes     Start date: 1996    Smokeless tobacco: Never Used    Tobacco comment: smoking juul   Vaping Use    Vaping Use: Never used   Substance Use Topics    Alcohol use: No    Drug use: No     Comment: history opiates        PHYSICAL EXAMINATION:  [ INSTRUCTIONS:  \"[x]\" Indicates a positive item  \"[]\" Indicates a negative item  -- DELETE ALL ITEMS NOT EXAMINED]  Vital Signs: (As obtained by patient/caregiver or practitioner observation)      Constitutional: [x] Appears well-developed and well-nourished [x] No apparent distress      [] Abnormal-   Mental status  [x] Alert and awake  [] Oriented to person/place/time []Able to follow commands      Eyes:  EOM    [x]  Normal  [] Abnormal-  Sclera  [x]  Normal  [] Abnormal -                  -      Diagnosis Orders   1. Opioid use disorder, severe, in sustained remission, on maintenance therapy (HCC)  buprenorphine-naloxone (SUBOXONE) 2-0.5 MG SUBL   2. Encounter for monitoring Suboxone maintenance therapy     3. Alcohol use       Patient is doing well  Continue Suboxone 2 mg daily for opioid use disorder  Follow-up 3 weeks  I reviewed the [place] Automated Rx Reporting System report     There does not appear to be any discrepancies or overprescribing of controlled substances        Patient was evaluated through a synchronous (real-time) audio-video encounter. The patient (or guardian if applicable) is aware that this is a billable service. Verbal consent to proceed has been obtained within the past 12 months.  The visit was conducted pursuant to the emergency declaration under the [address], [facility] waiver authority and the Coronavirus Preparedness and Response Supplemental Appropriations Act. Patient identification was verified, and a caregiver was present when appropriate. The patient was located in a state where the provider was credentialed to provide care. Total time spent on this encounter: Not billed by time    --Marlon Wolfe MD on 12/30/2021 at 9:18 AM    An electronic signature was used to authenticate this note.

## 2022-06-28 ENCOUNTER — TELEMEDICINE (OUTPATIENT)
Dept: INTERNAL MEDICINE CLINIC | Age: 42
End: 2022-06-28
Payer: COMMERCIAL

## 2022-06-28 DIAGNOSIS — Z79.899 ENCOUNTER FOR MONITORING SUBOXONE MAINTENANCE THERAPY: ICD-10-CM

## 2022-06-28 DIAGNOSIS — F11.21 OPIOID USE DISORDER, SEVERE, IN SUSTAINED REMISSION, ON MAINTENANCE THERAPY (HCC): Primary | ICD-10-CM

## 2022-06-28 DIAGNOSIS — Z51.81 ENCOUNTER FOR MONITORING SUBOXONE MAINTENANCE THERAPY: ICD-10-CM

## 2022-06-28 PROCEDURE — 99213 OFFICE O/P EST LOW 20 MIN: CPT | Performed by: INTERNAL MEDICINE

## 2022-06-28 RX ORDER — BUPRENORPHINE HYDROCHLORIDE AND NALOXONE HYDROCHLORIDE DIHYDRATE 2; .5 MG/1; MG/1
1 TABLET SUBLINGUAL DAILY
Qty: 28 TABLET | Refills: 0 | Status: CANCELLED | OUTPATIENT
Start: 2022-06-28 | End: 2022-07-26

## 2022-06-28 NOTE — PROGRESS NOTES
12/30/2021    TELEHEALTH EVALUATION -- Audio/Visual ([facility])  Patient was at home, I was in the office  There was no one else present during the interview  HPI:    Patient  has requested an audio/video evaluation for the following concern(s):  Opioid use disorder     I last saw her   5/11  We did a virtual visit 6/8  She was supposed to come in today but she forgot she had a hair appointment  She is actually getting  on the beach in [place] on Friday  Leaving tomorrow morning from [facility]  I told her it was okay to do a virtual visit                 We weaned her off of Suboxone from November to February  She got down to 1 mg and then got off in February  She had major urges and cravings we had to put her back on  Patient was referred by a friend  She is on a Suboxone clinic on the other side [facility] has to pay $200 every 4 weeks  Patient has had problems using pain pills  Patient started using pain pills 10 years ago  A friend had given her a pain pill and she said it gave her energy  Patient uses it she usually would swallow them but at the end she was snorting them  Other drugs used: No  ROS-Patient is feeling well  Patient is not experiencing  withdrawal symptoms ,no urges or cravings  Patient is not having any side effects from the buprenorphine      Prior to Visit Medications    Medication Sig Taking? Authorizing Provider   buprenorphine-naloxone (SUBOXONE) 8-2 MG FILM SL film Place 1 Film under the tongue 2 times daily for 28 days.   Gricelda Evans MD   aspirin-acetaminophen-caffeine (EXCEDRIN MIGRAINE) [phone] MG per tablet Take 1 tablet by mouth every 6 hours as needed for Headaches  Historical Provider, MD   levothyroxine (SYNTHROID) 125 MCG tablet Take 1 tablet by mouth daily  Gricelda Evans MD   acetaminophen (TYLENOL) 500 MG tablet Take 1 tablet by mouth every 4 hours as needed for Pain or Fever  VANNA Quan - CNP   levothyroxine (SYNTHROID) declaration under the [facility], [address] [place] [address] waiver authority and the [employer] General Act. Patient identification was verified, and a caregiver was present when appropriate. The patient was located in a state where the provider was credentialed to provide care. Total time spent on this encounter: Not billed by time    --Arlene Lopez MD on 12/30/2021 at 9:18 AM    An electronic signature was used to authenticate this note.

## 2022-07-28 ENCOUNTER — TELEPHONE (OUTPATIENT)
Dept: INTERNAL MEDICINE CLINIC | Age: 42
End: 2022-07-28

## 2022-07-28 DIAGNOSIS — F11.21 OPIOID USE DISORDER, SEVERE, IN SUSTAINED REMISSION, ON MAINTENANCE THERAPY (HCC): ICD-10-CM

## 2022-07-28 RX ORDER — BUPRENORPHINE HYDROCHLORIDE AND NALOXONE HYDROCHLORIDE DIHYDRATE 2; .5 MG/1; MG/1
1 TABLET SUBLINGUAL DAILY
Qty: 5 TABLET | Refills: 0 | Status: CANCELLED | OUTPATIENT
Start: 2022-07-28 | End: 2022-08-02

## 2022-07-28 RX ORDER — BUPRENORPHINE HYDROCHLORIDE AND NALOXONE HYDROCHLORIDE DIHYDRATE 2; .5 MG/1; MG/1
1 TABLET SUBLINGUAL DAILY
Qty: 5 TABLET | Refills: 0 | Status: SHIPPED | OUTPATIENT
Start: 2022-07-28 | End: 2022-08-03 | Stop reason: SDUPTHER

## 2022-07-28 NOTE — TELEPHONE ENCOUNTER
LPN called in script of Suboxone 2mg tab daily for 5 days qty 5 in to [facility] [employer], [place].
1

## 2022-08-03 ENCOUNTER — OFFICE VISIT (OUTPATIENT)
Dept: INTERNAL MEDICINE CLINIC | Age: 42
End: 2022-08-03
Payer: COMMERCIAL

## 2022-08-03 VITALS
BODY MASS INDEX: 24.01 KG/M2 | WEIGHT: 153 LBS | DIASTOLIC BLOOD PRESSURE: 76 MMHG | HEART RATE: 98 BPM | SYSTOLIC BLOOD PRESSURE: 138 MMHG | HEIGHT: 67 IN

## 2022-08-03 DIAGNOSIS — Z51.81 ENCOUNTER FOR MONITORING SUBOXONE MAINTENANCE THERAPY: ICD-10-CM

## 2022-08-03 DIAGNOSIS — F11.21 OPIOID USE DISORDER, SEVERE, IN SUSTAINED REMISSION, ON MAINTENANCE THERAPY (HCC): Primary | ICD-10-CM

## 2022-08-03 DIAGNOSIS — Z79.899 ENCOUNTER FOR MONITORING SUBOXONE MAINTENANCE THERAPY: ICD-10-CM

## 2022-08-03 DIAGNOSIS — Z78.9 ALCOHOL USE: ICD-10-CM

## 2022-08-03 PROCEDURE — 80305 DRUG TEST PRSMV DIR OPT OBS: CPT | Performed by: INTERNAL MEDICINE

## 2022-08-03 PROCEDURE — 99214 OFFICE O/P EST MOD 30 MIN: CPT | Performed by: INTERNAL MEDICINE

## 2022-08-03 RX ORDER — BUPRENORPHINE HYDROCHLORIDE AND NALOXONE HYDROCHLORIDE DIHYDRATE 2; .5 MG/1; MG/1
1 TABLET SUBLINGUAL DAILY
Qty: 28 TABLET | Refills: 0 | Status: SHIPPED | OUTPATIENT
Start: 2022-08-03 | End: 2022-08-31 | Stop reason: SDUPTHER

## 2022-08-07 NOTE — PROGRESS NOTES
Verbal order per Dr. Annabella Infante for urine drug screen. Positive for BUP ETG. Verified results with Thom House RN. Dr. Annabella Infante ordered Suboxone 2mg tab daily for patient. Verified dose with patient. Patient was sent home with 4 week script of Suboxone 2mg tab daily and will be seen back in the office 8/31/22.
time      Appearance: Patient is in no acute distress, normal appearance, patient does not appear intoxicated/    Memory: Normal    Behavior/motor: Normal    Mood: Good mood, upbeat    Affect: Mood congruent    Attitude toward examiner: Pleasant, respectful    Thought content no delusions hallucinations suicidal ideation    Insight: good    Judgment: good    Eyes: Pupils normal    Skin: No track marks noted ,no rashes noted    COWS score: N/A              Urine tox screen  Alcohol, Urine NEG    Amphetamine Screen, Urine NEG    Barbiturate Screen, Urine NEG    Benzodiazepine Screen, Urine NEG    Buprenorphine Urine POS    Cocaine Metabolite Screen, Urine NEG    FENTANYL SCREEN, URINE NEG    Gabapentin Screen, Urine N/A    MDMA, Urine NEG    Methadone Screen, Urine NEG    Methamphetamine, Urine NEG    Opiate Scrn, Ur NEG    Oxycodone Screen, Ur NEG    PCP Screen, Urine N/A    Propoxyphene Screen, Urine NEG    Synthetic Cannabinoids (K2) Screen, Urine NEG    THC Screen, Urine NEG    Tramadol Scrn, Ur NEG    Tricyclic Antidepressants, Urine N/A         Diagnosis Orders   1. Opioid use disorder, severe, in sustained remission, on maintenance therapy (HCC)  POCT Rapid Drug Screen    buprenorphine-naloxone (SUBOXONE) 2-0.5 MG SUBL      2. Encounter for monitoring Suboxone maintenance therapy              Patient is doing very well    She is not breast-feeding  I will switch her over to Suboxone 2 mg daily for severe opioid use disorder   follow-up 4 weeks with a video visit  I reviewed the [place] Automated Rx Reporting System report     There does not appear to be any discrepancies or overprescribing of controlled substances            --Graciela Sal MD on 8/7/2022 at 6:11 PM    An electronic signature was used to authenticate this note.

## 2022-08-31 ENCOUNTER — TELEMEDICINE (OUTPATIENT)
Dept: INTERNAL MEDICINE CLINIC | Age: 42
End: 2022-08-31
Payer: COMMERCIAL

## 2022-08-31 ENCOUNTER — TELEPHONE (OUTPATIENT)
Dept: INTERNAL MEDICINE CLINIC | Age: 42
End: 2022-08-31

## 2022-08-31 DIAGNOSIS — Z78.9 ALCOHOL USE: ICD-10-CM

## 2022-08-31 DIAGNOSIS — Z51.81 ENCOUNTER FOR MONITORING SUBOXONE MAINTENANCE THERAPY: ICD-10-CM

## 2022-08-31 DIAGNOSIS — F11.21 OPIOID USE DISORDER, SEVERE, IN SUSTAINED REMISSION, ON MAINTENANCE THERAPY (HCC): Primary | ICD-10-CM

## 2022-08-31 DIAGNOSIS — Z79.899 ENCOUNTER FOR MONITORING SUBOXONE MAINTENANCE THERAPY: ICD-10-CM

## 2022-08-31 PROCEDURE — 99213 OFFICE O/P EST LOW 20 MIN: CPT | Performed by: INTERNAL MEDICINE

## 2022-08-31 RX ORDER — BUPRENORPHINE HYDROCHLORIDE AND NALOXONE HYDROCHLORIDE DIHYDRATE 2; .5 MG/1; MG/1
1 TABLET SUBLINGUAL DAILY
Qty: 28 TABLET | Refills: 0 | OUTPATIENT
Start: 2022-08-31 | End: 2022-09-28 | Stop reason: SDUPTHER

## 2022-08-31 NOTE — TELEPHONE ENCOUNTER
VO per Dr. Jossy Diehl for Suboxone 2mg tab daily for 28 day qty 28. LPN called in script of Suboxone 2mg tab daily for 28 days qty 28 into [facility] in [facility], [address].

## 2022-09-02 NOTE — PROGRESS NOTES
12/30/2021    TELEHEALTH EVALUATION -- Audio/Visual (During UGMJT-56 public health emergency)  Patient was at home, I was in the office  There was no one else present during the interview  HPI:    Patient  has requested an audio/video evaluation for the following concern(s):  Opioid use disorder  I last saw her  8/3    Patient had a baby girl on 3/31  Recently she got  on the beach in [place]                       We weaned her off of Suboxone from November 2021 to February 2022  She got down to 1 mg and then got off in February  She had major urges and cravings we had to put her back on  Patient was referred by a friend  She is on a Suboxone clinic on the other side [facility] has to pay $200 every 4 weeks  Patient has had problems using pain pills  Patient started using pain pills 10 years ago  A friend had given her a pain pill and she said it gave her energy  Patient uses it she usually would swallow them but at the end she was snorting them  Other drugs used: No  ROS-Patient is feeling well  Patient is not experiencing  withdrawal symptoms ,no urges or cravings  Patient is not having any side effects from the buprenorphine         Prior to Visit Medications    Medication Sig Taking? Authorizing Provider   buprenorphine-naloxone (SUBOXONE) 8-2 MG FILM SL film Place 1 Film under the tongue 2 times daily for 28 days.   Katheryn Fregoso MD   aspirin-acetaminophen-caffeine (EXCEDRIN MIGRAINE) [phone] MG per tablet Take 1 tablet by mouth every 6 hours as needed for Headaches  Historical Provider, MD   levothyroxine (SYNTHROID) 125 MCG tablet Take 1 tablet by mouth daily  Katheryn Fregoso MD   acetaminophen (TYLENOL) 500 MG tablet Take 1 tablet by mouth every 4 hours as needed for Pain or Fever  VANNA Nobles - CNP   levothyroxine (SYNTHROID) 125 MCG tablet Take 1 tablet by mouth Daily  Katheryn Fregoso MD   albuterol sulfate  (90 Base) MCG/ACT inhaler Inhale 2 puffs into the lungs every 4 hours as needed for Wheezing or Shortness of Breath  Abdulaziz Pinedo, APRN - CNP       Social History     Tobacco Use    Smoking status: Current Every Day Smoker     Packs/day: 0.50     Years: 22.00     Pack years: 11.00     Types: Cigarettes     Start date: 1996    Smokeless tobacco: Never Used    Tobacco comment: smoking juul   Vaping Use    Vaping Use: Never used   Substance Use Topics    Alcohol use: No    Drug use: No     Comment: history opiates        PHYSICAL EXAMINATION:  [ INSTRUCTIONS:  \"[x]\" Indicates a positive item  \"[]\" Indicates a negative item  -- DELETE ALL ITEMS NOT EXAMINED]  Vital Signs: (As obtained by patient/caregiver or practitioner observation)      Constitutional: [x] Appears well-developed and well-nourished [x] No apparent distress      [] Abnormal-   Mental status  [x] Alert and awake  [] Oriented to person/place/time []Able to follow commands      Eyes:  EOM    [x]  Normal  [] Abnormal-  Sclera  [x]  Normal  [] Abnormal -                  -      Diagnosis Orders   1. Opioid use disorder, severe, in sustained remission, on maintenance therapy (HCC)  buprenorphine-naloxone (SUBOXONE) 2-0.5 MG SUBL      2. Encounter for monitoring Suboxone maintenance therapy        3. Alcohol use        Patient is doing well  Continue Suboxone 2 mg  daily for opioid use disorder  Follow-up 4 weeks  I reviewed the [place] Automated Rx Reporting System report     There does not appear to be any discrepancies or overprescribing of controlled substances        Patient was evaluated through a synchronous (real-time) audio-video encounter. The patient (or guardian if applicable) is aware that this is a billable service. Verbal consent to proceed has been obtained within the past 12 months. The visit was conducted pursuant to the emergency declaration under the [facility], [address] [place] [address] authority and the [employer] General Act.   Patient identification was verified, and a caregiver was present when appropriate. The patient was located in a state where the provider was credentialed to provide care. Total time spent on this encounter: Not billed by time    --David Schwartz MD on 12/30/2021 at 9:18 AM    An electronic signature was used to authenticate this note.

## 2022-09-28 ENCOUNTER — OFFICE VISIT (OUTPATIENT)
Dept: INTERNAL MEDICINE CLINIC | Age: 42
End: 2022-09-28
Payer: COMMERCIAL

## 2022-09-28 VITALS
HEIGHT: 67 IN | HEART RATE: 75 BPM | DIASTOLIC BLOOD PRESSURE: 96 MMHG | TEMPERATURE: 97.7 F | WEIGHT: 163.8 LBS | RESPIRATION RATE: 18 BRPM | BODY MASS INDEX: 25.71 KG/M2 | SYSTOLIC BLOOD PRESSURE: 142 MMHG

## 2022-09-28 DIAGNOSIS — F19.10 POLYSUBSTANCE ABUSE (HCC): ICD-10-CM

## 2022-09-28 DIAGNOSIS — F11.21 OPIOID USE DISORDER, SEVERE, IN SUSTAINED REMISSION, ON MAINTENANCE THERAPY (HCC): Primary | ICD-10-CM

## 2022-09-28 DIAGNOSIS — Z78.9 ALCOHOL USE: ICD-10-CM

## 2022-09-28 DIAGNOSIS — Z79.899 ENCOUNTER FOR MONITORING SUBOXONE MAINTENANCE THERAPY: ICD-10-CM

## 2022-09-28 DIAGNOSIS — Z51.81 ENCOUNTER FOR MONITORING SUBOXONE MAINTENANCE THERAPY: ICD-10-CM

## 2022-09-28 PROCEDURE — 99214 OFFICE O/P EST MOD 30 MIN: CPT | Performed by: INTERNAL MEDICINE

## 2022-09-28 PROCEDURE — 80305 DRUG TEST PRSMV DIR OPT OBS: CPT | Performed by: INTERNAL MEDICINE

## 2022-09-28 RX ORDER — BUPRENORPHINE HYDROCHLORIDE AND NALOXONE HYDROCHLORIDE DIHYDRATE 2; .5 MG/1; MG/1
1 TABLET SUBLINGUAL DAILY
Qty: 28 TABLET | Refills: 0 | Status: SHIPPED | OUTPATIENT
Start: 2022-09-28 | End: 2022-10-26 | Stop reason: SDUPTHER

## 2022-09-28 NOTE — PROGRESS NOTES
Verbal order per Dr. Charito Cline for urine drug screen. Positive for BUP THC. Verified results with Kerry Conte RN. Dr. Charito Cline ordered Suboxone 2mg tab daily  for patient. Verified dose with patient. Patient was sent home with 4 week script of Suboxone 2mg tab BID and will be seen back in the office 10/26/22.

## 2022-09-29 ASSESSMENT — PATIENT HEALTH QUESTIONNAIRE - PHQ9
1. LITTLE INTEREST OR PLEASURE IN DOING THINGS: 0
SUM OF ALL RESPONSES TO PHQ9 QUESTIONS 1 & 2: 0
2. FEELING DOWN, DEPRESSED OR HOPELESS: 0
SUM OF ALL RESPONSES TO PHQ QUESTIONS 1-9: 0

## 2022-10-08 NOTE — PROGRESS NOTES
9/28/2022        Drug Problem       I last saw her  8/3  We did a video visit 8/31  Patient had a baby girl on 3/31  Recently she got  on the beach in [place]                We weaned her off of Suboxone from November 2021 to February 2022  She got down to 1 mg and then got off in February  She had major urges and cravings we had to put her back on  Patient was referred by a friend  She is on a Suboxone clinic on the other side [facility] has to pay $200 every 4 weeks  Patient has had problems using pain pills  Patient started using pain pills 10 years ago  A friend had given her a pain pill and she said it gave her energy  Patient uses it she usually would swallow them but at the end she was snorting them  Other drugs used: No  ROS-Patient is feeling well  Patient is not experiencing  withdrawal symptoms ,no urges or cravings  Patient is not having any side effects from the buprenorphine      Prior to Visit Medications    Medication Sig Taking? Authorizing Provider   buprenorphine-naloxone (SUBOXONE) 2-0.5 MG SUBL Place 1 tablet under the tongue daily for 28 days. Yes Bharati Owen MD   escitalopram (LEXAPRO) 20 MG tablet Take 1 tablet by mouth daily  Bharati Owen MD   escitalopram (LEXAPRO) 20 MG tablet take 1 tablet by mouth once daily  Historical Provider, MD       Social History     Tobacco Use    Smoking status: Every Day     Packs/day: 0.25     Types: Cigarettes    Smokeless tobacco: Never   Substance Use Topics    Alcohol use: Yes     Alcohol/week: 2.0 standard drinks     Types: 2 Shots of liquor per week    Drug use: Yes     Types: Marijuana (Sheila Em), Opiates      Comment: last used percocet and vicodin 2013            PHYSICAL EXAMINATIO  Blood pressure (!) 142/96, pulse 75, temperature 97.7 °F (36.5 °C), temperature source Tympanic, resp. rate 18, height 5' 7\" (1.702 m), weight 163 lb 12.8 oz (74.3 kg), currently breastfeeding. Message left for patient to return my call.     Mental status examination    Cognition: oriented to person place and time      Appearance: Patient is in no acute distress, normal appearance, patient does not appear intoxicated/    Memory: Normal    Behavior/motor: Normal    Mood: Good mood, upbeat    Affect: Mood congruent    Attitude toward examiner: Pleasant, respectful    Thought content no delusions hallucinations suicidal ideation    Insight: good    Judgment: good    Eyes: Pupils normal    Skin: No track marks noted ,no rashes noted    COWS score: N/A              Urine tox screen    Alcohol, Urine NEG    Amphetamine Screen, Urine NEG    Barbiturate Screen, Urine NEG    Benzodiazepine Screen, Urine NEG    Buprenorphine Urine POS    Cocaine Metabolite Screen, Urine NEG    FENTANYL SCREEN, URINE NEG    Gabapentin Screen, Urine N/A    MDMA, Urine NEG    Methadone Screen, Urine NEG    Methamphetamine, Urine NEG    Opiate Scrn, Ur NEG    Oxycodone Screen, Ur NEG    PCP Screen, Urine N/A    Propoxyphene Screen, Urine NEG    Synthetic Cannabinoids (K2) Screen, Urine NEG    THC Screen, Urine POS    Tramadol Scrn, Ur NEG    Tricyclic Antidepressants, Urine N/A       Diagnosis Orders   1. Opioid use disorder, severe, in sustained remission, on maintenance therapy ([facility])  POCT Rapid Drug Screen    buprenorphine-naloxone (SUBOXONE) 2-0.5 MG SUBL      2. Encounter for monitoring Suboxone maintenance therapy        3. Alcohol use        4. Polysubstance abuse ([facility] [address]                Patient is doing very well    She is not breast-feeding  I will switch her over to Suboxone 2 mg daily for severe opioid use disorder   follow-up 4 weeks with a video visit  I reviewed the [facility] report     There does not appear to be any discrepancies or overprescribing of controlled substances            --Kimmy Owens MD on 10/8/2022 at 3:48 PM    An electronic signature was used to authenticate this note.

## 2022-10-26 ENCOUNTER — TELEMEDICINE (OUTPATIENT)
Dept: INTERNAL MEDICINE CLINIC | Age: 42
End: 2022-10-26
Payer: COMMERCIAL

## 2022-10-26 DIAGNOSIS — F19.10 POLYSUBSTANCE ABUSE (HCC): ICD-10-CM

## 2022-10-26 DIAGNOSIS — Z78.9 ALCOHOL USE: ICD-10-CM

## 2022-10-26 DIAGNOSIS — Z79.899 ENCOUNTER FOR MONITORING SUBOXONE MAINTENANCE THERAPY: ICD-10-CM

## 2022-10-26 DIAGNOSIS — Z51.81 ENCOUNTER FOR MONITORING SUBOXONE MAINTENANCE THERAPY: ICD-10-CM

## 2022-10-26 DIAGNOSIS — F11.20 SEVERE OPIOID USE DISORDER (HCC): Primary | ICD-10-CM

## 2022-10-26 PROCEDURE — 99213 OFFICE O/P EST LOW 20 MIN: CPT | Performed by: INTERNAL MEDICINE

## 2022-10-26 RX ORDER — BUPRENORPHINE HYDROCHLORIDE AND NALOXONE HYDROCHLORIDE DIHYDRATE 2; .5 MG/1; MG/1
1 TABLET SUBLINGUAL DAILY
Qty: 28 TABLET | Refills: 0 | Status: SHIPPED | OUTPATIENT
Start: 2022-10-26 | End: 2022-11-23 | Stop reason: SDUPTHER

## 2022-10-26 NOTE — PROGRESS NOTES
12/30/2021    TELEHEALTH EVALUATION -- Audio/Visual (During KTAFE-75 public health emergency)  Patient was at home, I was in the office  There was no one else present during the interview  HPI:    Patient  has requested an audio/video evaluation for the following concern(s):  Opioid use disorder  I last saw her  9/28  We did a video visit 8/31  Patient had a baby girl on 3/31  Recently she got  on the beach in [place]                       We weaned her off of Suboxone from November 2021 to February 2022  She got down to 1 mg and then got off in February  She had major urges and cravings we had to put her back on  Patient was referred by a friend  She is on a [facility] clinic [facility] has to pay $200 every 4 weeks  Patient has had problems using pain pills  Patient started using pain pills 10 years ago  A friend had given her a pain pill and she said it gave her energy  Patient uses it she usually would swallow them but at the end she was snorting them  Other drugs used: No  ROS-Patient is feeling well  Patient is not experiencing  withdrawal symptoms ,no urges or cravings  Patient is not having any side effects from the buprenorphine      Prior to Visit Medications    Medication Sig Taking? Authorizing Provider   buprenorphine-naloxone (SUBOXONE) 8-2 MG FILM SL film Place 1 Film under the tongue 2 times daily for 28 days.   Esther Grubbs MD   aspirin-acetaminophen-caffeine (EXCEDRIN MIGRAINE) 278-852-41 MG per tablet Take 1 tablet by mouth every 6 hours as needed for Headaches  Historical Provider, MD   levothyroxine (SYNTHROID) 125 MCG tablet Take 1 tablet by mouth daily  Esther Grubbs MD   acetaminophen (TYLENOL) 500 MG tablet Take 1 tablet by mouth every 4 hours as needed for Pain or Fever  VANNA Jarrell - CNP   levothyroxine (SYNTHROID) 125 MCG tablet Take 1 tablet by mouth Daily  Esther Grubbs MD   albuterol sulfate  (90 Base) MCG/ACT inhaler Inhale 2 puffs Act.  Patient identification was verified, and a caregiver was present when appropriate. The patient was located in a state where the provider was credentialed to provide care. Total time spent on this encounter: Not billed by time    --Burke Velez MD on 12/30/2021 at 9:18 AM    An electronic signature was used to authenticate this note.

## 2022-11-23 ENCOUNTER — OFFICE VISIT (OUTPATIENT)
Dept: INTERNAL MEDICINE CLINIC | Age: 42
End: 2022-11-23
Payer: COMMERCIAL

## 2022-11-23 VITALS
HEART RATE: 99 BPM | DIASTOLIC BLOOD PRESSURE: 81 MMHG | SYSTOLIC BLOOD PRESSURE: 121 MMHG | HEIGHT: 67 IN | BODY MASS INDEX: 26.21 KG/M2 | WEIGHT: 167 LBS

## 2022-11-23 DIAGNOSIS — F11.20 SEVERE OPIOID USE DISORDER (HCC): Primary | ICD-10-CM

## 2022-11-23 PROCEDURE — 80305 DRUG TEST PRSMV DIR OPT OBS: CPT | Performed by: NURSE PRACTITIONER

## 2022-11-23 PROCEDURE — 99214 OFFICE O/P EST MOD 30 MIN: CPT | Performed by: NURSE PRACTITIONER

## 2022-11-23 RX ORDER — BUPRENORPHINE HYDROCHLORIDE AND NALOXONE HYDROCHLORIDE DIHYDRATE 2; .5 MG/1; MG/1
1 TABLET SUBLINGUAL DAILY
Qty: 28 TABLET | Refills: 0 | Status: SHIPPED | OUTPATIENT
Start: 2022-11-23 | End: 2022-12-21

## 2022-11-23 NOTE — PROGRESS NOTES
11/23/22   The patients primary care physician is Silvina Donovan DO    Sugar May is a 36 y.o.  female who presents in office today for follow up medication assisted treatment, substance use disorder. Pt established with Dr Claritza Mijares  Last visit 9/28    Pt denies any urges, triggers, or cravings. UDS positive for buprenorphine and et      Pertinent Drug History  Patient has had problems using pain pills  Patient started using pain pills 10 years ago  A friend had given her a pain pill and she said it gave her energy  Patient uses it she usually would swallow them but at the end she was snorting them  Other drugs used: No    She weaned her off of Suboxone from November 2021 to February 2022  She got down to 1 mg and then got off in February  She had major urges and cravings we had to put her back on    No past medical history on file. Social History     Socioeconomic History    Marital status: Unknown     Spouse name: Not on file    Number of children: Not on file    Years of education: Not on file    Highest education level: Not on file   Occupational History    Not on file   Tobacco Use    Smoking status: Every Day     Packs/day: 0.25     Types: Cigarettes    Smokeless tobacco: Never   Substance and Sexual Activity    Alcohol use:  Yes     Alcohol/week: 2.0 standard drinks     Types: 2 Shots of liquor per week    Drug use: Yes     Types: Marijuana (Kobe Chihuahua), Opiates      Comment: last used percocet and vicodin 2013    Sexual activity: Yes     Partners: Male   Other Topics Concern    Not on file   Social History Narrative    Not on file     Social Determinants of Health     Financial Resource Strain: Not on file   Food Insecurity: Not on file   Transportation Needs: Not on file   Physical Activity: Not on file   Stress: Not on file   Social Connections: Not on file   Intimate Partner Violence: Not on file   Housing Stability: Not on file         Current Outpatient Medications on File Prior to Visit   Medication Sig Dispense Refill    buprenorphine-naloxone (SUBOXONE) 2-0.5 MG SUBL Place 1 tablet under the tongue daily for 28 days. 28 tablet 0    escitalopram (LEXAPRO) 20 MG tablet take 1 tablet by mouth once daily       No current facility-administered medications on file prior to visit. Vitals:    11/23/22 1354   BP: 121/81   Pulse: 99        Cognition: alert, oriented to person, place, and time  Appearance: appropriate, no acute distress, does not appear intoxicated or in withdrawal  Memory: Normal  Behavioral/motor: normal  Affect: congruent  Attitude toward examiner: respectful, pleasant  Thought content: no delusions, hallucination, Denies suicidal ideation or intent  Insight: fair  Judgement: fair  Eyes: pupils normal  Skin: no rashes, no track marks noted        There is no problem list on file for this patient. PDMP Monitoring:    Last PDMP [place] as Reviewed [facility]):  Review User Review Instant Review Result                I reviewed the [place] Automated Rx Reporting System report     There does not appear to be any discrepancies or overprescribing of controlled substances    GOAL:  To enhance patient recovery through the use of medication assisted treatment to improve overall quality of life. OBJECTIVE:  Patient will abstain from the use of mood altering substances 7 out of 7 days per week. INTERVENTION:  Patient will be maintained on suboxone medication.   The importance of combining medical assisted treatment with comprehensive treatment including counseling, support groups, and psychiatry as applicable was discussed with patient    Plan:   Orders Placed This Encounter   Procedures    POCT Rapid Drug Screen        VANNA Vogel CNP 11/23/22 2:22 PM

## 2022-11-23 NOTE — PROGRESS NOTES
Verbal order per Valente Crandall CNP for urine drug screen. Positive for BUP ETG.  Verified results with Illene Floresita ROSARIO.

## 2022-12-21 ENCOUNTER — TELEMEDICINE (OUTPATIENT)
Dept: INTERNAL MEDICINE CLINIC | Age: 42
End: 2022-12-21
Payer: COMMERCIAL

## 2022-12-21 DIAGNOSIS — F11.20 SEVERE OPIOID USE DISORDER (HCC): ICD-10-CM

## 2022-12-21 PROCEDURE — 99212 OFFICE O/P EST SF 10 MIN: CPT | Performed by: NURSE PRACTITIONER

## 2022-12-21 RX ORDER — ESCITALOPRAM OXALATE 20 MG/1
TABLET ORAL
Qty: 30 TABLET | Refills: 3 | Status: SHIPPED | OUTPATIENT
Start: 2022-12-21

## 2022-12-21 RX ORDER — BUPRENORPHINE HYDROCHLORIDE AND NALOXONE HYDROCHLORIDE DIHYDRATE 2; .5 MG/1; MG/1
1 TABLET SUBLINGUAL DAILY
Qty: 28 TABLET | Refills: 0 | Status: SHIPPED | OUTPATIENT
Start: 2022-12-21 | End: 2023-01-18

## 2022-12-21 NOTE — PROGRESS NOTES
12/21/22   The patients primary care physician is Carly Lucas DO    Esperanza Pearce is a 36 y.o.  female televist for follow up medication assisted treatment, substance use disorder. Pt was established with Dr Kaba All  She weaned off suboxone from Nov 2021 to Feb 2022. Began having cravings and placed back on it. Cravings are controlled with suboxone 2mg daily    Depression managed with lexapro          Pertinent Drug History  Patient started using pain pills 10 years ago  A friend had given her a pain pill and she said it gave her energy  Patient uses it she usually would swallow them but at the end she was snorting them  Other drugs used: No  No past medical history on file. Social History     Socioeconomic History    Marital status: Unknown     Spouse name: Not on file    Number of children: Not on file    Years of education: Not on file    Highest education level: Not on file   Occupational History    Not on file   Tobacco Use    Smoking status: Every Day     Packs/day: 0.25     Types: Cigarettes    Smokeless tobacco: Never   Substance and Sexual Activity    Alcohol use: Yes     Alcohol/week: 2.0 standard drinks     Types: 2 Shots of liquor per week    Drug use: Yes     Types: Marijuana (Earna Almonte), Opiates      Comment: last used percocet and vicodin 2013    Sexual activity: Yes     Partners: Male   Other Topics Concern    Not on file   Social History Narrative    Not on file     Social Determinants of Health     Financial Resource Strain: Not on file   Food Insecurity: Not on file   Transportation Needs: Not on file   Physical Activity: Not on file   Stress: Not on file   Social Connections: Not on file   Intimate Partner Violence: Not on file   Housing Stability: Not on file         Current Outpatient Medications on File Prior to Visit   Medication Sig Dispense Refill    buprenorphine-naloxone (SUBOXONE) 2-0.5 MG SUBL Place 1 tablet under the tongue daily for 28 days.  28 tablet 0 escitalopram (LEXAPRO) 20 MG tablet take 1 tablet by mouth once daily       No current facility-administered medications on file prior to visit. There were no vitals filed for this visit. Cognition: alert, oriented to person, place, and time  Appearance: appropriate, no acute distress, does not appear intoxicated or in withdrawal  Memory: Normal  Behavioral/motor: normal  Affect: congruent  Attitude toward examiner: respectful, pleasant  Thought content: no delusions, hallucination, Denies suicidal ideation or intent  Insight: fair  Judgement: fair  Eyes: pupils normal  Skin: no rashes, no track marks noted        There is no problem list on file for this patient. PDMP Monitoring:    Last PDMP [place] as Reviewed [facility]):  Review User Review Instant Review Result                I reviewed the [place] Automated Rx Reporting System report     There does not appear to be any discrepancies or overprescribing of controlled substances    GOAL:  To enhance patient recovery through the use of medication assisted treatment to improve overall quality of life. OBJECTIVE:  Patient will abstain from the use of mood altering substances 7 out of 7 days per week. INTERVENTION:  Patient will be maintained on suboxone medication. The importance of combining medical assisted treatment with comprehensive treatment including counseling, support groups, and psychiatry as applicable was discussed with patient    Plan:   No orders of the defined types were placed in this encounter.        VANNA Riojas CNP 12/21/22 3:09 PM

## 2023-01-23 ENCOUNTER — OFFICE VISIT (OUTPATIENT)
Dept: INTERNAL MEDICINE CLINIC | Age: 43
End: 2023-01-23
Payer: COMMERCIAL

## 2023-01-23 VITALS
HEIGHT: 67 IN | WEIGHT: 168 LBS | DIASTOLIC BLOOD PRESSURE: 89 MMHG | BODY MASS INDEX: 26.37 KG/M2 | SYSTOLIC BLOOD PRESSURE: 139 MMHG | HEART RATE: 90 BPM

## 2023-01-23 DIAGNOSIS — F11.20 SEVERE OPIOID USE DISORDER (HCC): Primary | ICD-10-CM

## 2023-01-23 PROCEDURE — 80305 DRUG TEST PRSMV DIR OPT OBS: CPT | Performed by: NURSE PRACTITIONER

## 2023-01-23 PROCEDURE — 99214 OFFICE O/P EST MOD 30 MIN: CPT | Performed by: NURSE PRACTITIONER

## 2023-01-23 RX ORDER — BUPRENORPHINE HYDROCHLORIDE AND NALOXONE HYDROCHLORIDE DIHYDRATE 2; .5 MG/1; MG/1
1 TABLET SUBLINGUAL DAILY
Qty: 28 TABLET | Refills: 0 | Status: SHIPPED | OUTPATIENT
Start: 2023-01-23 | End: 2023-02-20

## 2023-01-23 NOTE — PROGRESS NOTES
Verbal order per Mikhail Sagastume CNP for urine drug screen. Positive for BUP. Verified results with Estella OVIEDO LPN.     Jada Tapia

## 2023-01-23 NOTE — PROGRESS NOTES
01/23/23   The patients primary care physician is Elmira Quiroz DO    Syeda Morris is a 36 y.o.  female who presents in office today for follow up medication assisted treatment, substance use disorder. Pt established with Dr Cathie Ochoa since 11/24/2020    Pt states she has tried to wean off suboxone several times and has been unable to tolerate it. Pt states she has been clean since 2012    Pt denies any urges, triggers, or cravings. UDS acceptable    Pertinent Drug History  Patient has had problems using pain pills- taking on average 10 Vicodin or percocets per day  Patient started using pain pills after an injury  A friend had given her a pain pill and she said it gave her energy  Patient uses it she usually would swallow them but at the end she was snorting them  Other drugs used: No     She weaned her off of Suboxone from November 2021 to February 2022  She got down to 1 mg and then got off in February  She had major urges and cravings we had to put her back on        Social History     Socioeconomic History    Marital status: Unknown     Spouse name: Not on file    Number of children: Not on file    Years of education: Not on file    Highest education level: Not on file   Occupational History    Not on file   Tobacco Use    Smoking status: Every Day     Packs/day: 0.25     Types: Cigarettes    Smokeless tobacco: Never   Substance and Sexual Activity    Alcohol use:  Yes     Alcohol/week: 2.0 standard drinks     Types: 2 Shots of liquor per week    Drug use: Yes     Types: Marijuana (Jerone Orris), Opiates      Comment: last used percocet and vicodin 2013    Sexual activity: Yes     Partners: Male   Other Topics Concern    Not on file   Social History Narrative    Not on file     Social Determinants of Health     Financial Resource Strain: Not on file   Food Insecurity: Not on file   Transportation Needs: Not on file   Physical Activity: Not on file   Stress: Not on file   Social Connections: Not on file   Intimate Partner Violence: Not on file   Housing Stability: Not on file         Current Outpatient Medications on File Prior to Visit   Medication Sig Dispense Refill    buprenorphine-naloxone (SUBOXONE) 2-0.5 MG SUBL Place 1 tablet under the tongue daily for 28 days. 28 tablet 0    escitalopram (LEXAPRO) 20 MG tablet take 1 tablet by mouth once daily 30 tablet 3     No current facility-administered medications on file prior to visit. Vitals:    01/23/23 1619   BP: 139/89   Pulse: 90        Cognition: alert, oriented to person, place, and time  Appearance: appropriate, no acute distress, does not appear intoxicated or in withdrawal  Memory: Normal  Behavioral/motor: normal  Affect: congruent  Attitude toward examiner: respectful, pleasant  Thought content: no delusions, hallucination, Denies suicidal ideation or intent  Insight: fair  Judgement: fair  Eyes: pupils normal  Skin: no rashes, no track marks noted        There is no problem list on file for this patient. PDMP Monitoring:    Last PDMP [place] as Reviewed [facility]):  Review User Review Instant Review Result                I reviewed the [place] Automated Rx Reporting System report     There does not appear to be any discrepancies or overprescribing of controlled substances    GOAL:  To enhance patient recovery through the use of medication assisted treatment to improve overall quality of life. OBJECTIVE:  Patient will abstain from the use of mood altering substances 7 out of 7 days per week. INTERVENTION:  Patient will be maintained on suboxone medication.   The importance of combining medical assisted treatment with comprehensive treatment including counseling, support groups, and psychiatry as applicable was discussed with patient    Orders Placed This Encounter   Procedures    POCT Rapid Drug Screen        VANNA Kebede CNP 01/23/23 4:49 PM

## 2023-02-20 ENCOUNTER — TELEMEDICINE (OUTPATIENT)
Dept: INTERNAL MEDICINE CLINIC | Age: 43
End: 2023-02-20
Payer: COMMERCIAL

## 2023-02-20 DIAGNOSIS — F11.20 SEVERE OPIOID USE DISORDER (HCC): ICD-10-CM

## 2023-02-20 PROCEDURE — 99212 OFFICE O/P EST SF 10 MIN: CPT | Performed by: NURSE PRACTITIONER

## 2023-02-20 RX ORDER — BUPRENORPHINE HYDROCHLORIDE AND NALOXONE HYDROCHLORIDE DIHYDRATE 2; .5 MG/1; MG/1
1 TABLET SUBLINGUAL DAILY
Qty: 28 TABLET | Refills: 0 | Status: SHIPPED | OUTPATIENT
Start: 2023-02-20 | End: 2023-03-20

## 2023-02-20 RX ORDER — ESCITALOPRAM OXALATE 20 MG/1
TABLET ORAL
Qty: 30 TABLET | Refills: 3 | Status: CANCELLED | OUTPATIENT
Start: 2023-02-20

## 2023-02-20 NOTE — PROGRESS NOTES
Sugar May (:  1980) is a Established patient, here for evaluation of the following: medication assisted treatment, substance use disorder. Pt established with Dr Claritza Mijares since 2020  She was last seen in office on 23  Pt states she has been clean since      Pt denies any urges, triggers, or cravings. Denies any acute concerns or additional needs at this time    Depression managed well with lexapro       Assessment & Plan   Below is the assessment and plan developed based on review of pertinent history, physical exam, labs, studies, and medications. 1. Severe opioid use disorder ([facility] [address]  The following orders have not been finalized:  -     buprenorphine-naloxone (SUBOXONE) 2-0.5 MG SUBL    No follow-ups on file. Sugar May, was evaluated through a audio encounter. The patient (or guardian if applicable) is aware that this is a billable service, which includes applicable co-pays. This Virtual Visit was conducted with patient's (and/or legal guardian's) consent. The visit was conducted pursuant to the emergency declaration under the [facility] waiver authority and the [employer] General Act. Patient identification was verified, and a caregiver was present when appropriate.    The patient was located at Home: [address]  Provider was located at [place] [address] (Appt Dept): [address] [facility],  [address], APRN - CNP

## 2023-03-13 ENCOUNTER — OFFICE VISIT (OUTPATIENT)
Dept: INTERNAL MEDICINE CLINIC | Age: 43
End: 2023-03-13
Payer: COMMERCIAL

## 2023-03-13 DIAGNOSIS — F11.20 SEVERE OPIOID USE DISORDER (HCC): Primary | ICD-10-CM

## 2023-03-13 PROCEDURE — 80305 DRUG TEST PRSMV DIR OPT OBS: CPT | Performed by: NURSE PRACTITIONER

## 2023-03-13 PROCEDURE — 99214 OFFICE O/P EST MOD 30 MIN: CPT | Performed by: NURSE PRACTITIONER

## 2023-03-13 RX ORDER — BUPRENORPHINE HYDROCHLORIDE AND NALOXONE HYDROCHLORIDE DIHYDRATE 2; .5 MG/1; MG/1
1 TABLET SUBLINGUAL DAILY
Qty: 28 TABLET | Refills: 0 | Status: SHIPPED | OUTPATIENT
Start: 2023-03-13 | End: 2023-04-10

## 2023-03-13 NOTE — PROGRESS NOTES
03/13/23   The patients primary care physician is Blanca Antunez,     Juan Cherry is a 39 y.o.  female who presents in office today for follow up medication assisted treatment, substance use disorder. Pt established with Dr Lupe Paz since 11/24/2020    Pt denies any use of illicit substances other than THC since 2012    Pt denies any urges, triggers, or cravings. UDS Positive for ETG BUP THC    Depression managed well with lexapro    Denies any additional needs or acute concerns at this time    Pertinent Drug History  Patient has had problems using pain pills- taking on average 10 Vicodin or percocets per day  Patient started using pain pills after an injury  A friend had given her a pain pill and she said it gave her energy  Patient uses it she usually would swallow them but at the end she was snorting them  Other drugs used: No     She weaned her off of Suboxone from November 2021 to February 2022  She got down to 1 mg and then got off in February  She had major urges and cravings we had to put her back on        Social History     Socioeconomic History    Marital status: Unknown     Spouse name: Not on file    Number of children: Not on file    Years of education: Not on file    Highest education level: Not on file   Occupational History    Not on file   Tobacco Use    Smoking status: Every Day     Packs/day: 0.25     Types: Cigarettes    Smokeless tobacco: Never   Substance and Sexual Activity    Alcohol use:  Yes     Alcohol/week: 2.0 standard drinks     Types: 2 Shots of liquor per week    Drug use: Yes     Types: Marijuana (Shi Drain), Opiates      Comment: last used percocet and vicodin 2013    Sexual activity: Yes     Partners: Male   Other Topics Concern    Not on file   Social History Narrative    Not on file     Social Determinants of Health     Financial Resource Strain: Not on file   Food Insecurity: Not on file   Transportation Needs: Not on file   Physical Activity: Not on file Stress: Not on file   Social Connections: Not on file   Intimate Partner Violence: Not on file   Housing Stability: Not on file         Current Outpatient Medications on File Prior to Visit   Medication Sig Dispense Refill    buprenorphine-naloxone (SUBOXONE) 2-0.5 MG SUBL Place 1 tablet under the tongue daily for 28 days. 28 tablet 0    escitalopram (LEXAPRO) 20 MG tablet take 1 tablet by mouth once daily 30 tablet 3     No current facility-administered medications on file prior to visit. Cognition: alert, oriented to person, place, and time  Appearance: appropriate, no acute distress, does not appear intoxicated or in withdrawal  Memory: Normal  Behavioral/motor: normal  Affect: congruent  Attitude toward examiner: respectful, pleasant  Thought content: no delusions, hallucination, Denies suicidal ideation or intent  Insight: fair  Judgement: fair  Eyes: pupils normal  Skin: no rashes, no track marks noted        There is no problem list on file for this patient. PDMP Monitoring:    Last PDMP [place] as Reviewed [facility]):  Review User Review Instant Review Result                I reviewed the [place] Automated Rx Reporting System report     There does not appear to be any discrepancies or overprescribing of controlled substances    GOAL:  To enhance patient recovery through the use of medication assisted treatment to improve overall quality of life. OBJECTIVE:  Patient will abstain from the use of mood altering substances 7 out of 7 days per week. INTERVENTION:  Patient will be maintained on suboxone medication.   The importance of combining medical assisted treatment with comprehensive treatment including counseling, support groups, and psychiatry as applicable was discussed with patient    Orders Placed This Encounter   Procedures    POCT Rapid Drug Screen        VANNA Roger CNP 03/13/23 4:21 PM

## 2023-03-13 NOTE — PROGRESS NOTES
Verbal order per Juliann Robertson B.CNP for urine drug screen. Positive for ETG BUP THC. Verified results with Estella OVIEDO LPN.

## 2023-05-17 RX ORDER — ESCITALOPRAM OXALATE 20 MG/1
TABLET ORAL
Qty: 30 TABLET | Refills: 3 | Status: SHIPPED | OUTPATIENT
Start: 2023-05-17

## 2023-05-18 ENCOUNTER — OFFICE VISIT (OUTPATIENT)
Dept: INTERNAL MEDICINE CLINIC | Age: 43
End: 2023-05-18
Payer: COMMERCIAL

## 2023-05-18 VITALS
SYSTOLIC BLOOD PRESSURE: 138 MMHG | RESPIRATION RATE: 16 BRPM | HEART RATE: 86 BPM | HEIGHT: 67 IN | WEIGHT: 165 LBS | BODY MASS INDEX: 25.9 KG/M2 | DIASTOLIC BLOOD PRESSURE: 86 MMHG

## 2023-05-18 DIAGNOSIS — F41.8 DEPRESSION WITH ANXIETY: ICD-10-CM

## 2023-05-18 DIAGNOSIS — F11.20 SEVERE OPIOID USE DISORDER (HCC): Primary | ICD-10-CM

## 2023-05-18 PROCEDURE — 99214 OFFICE O/P EST MOD 30 MIN: CPT | Performed by: NURSE PRACTITIONER

## 2023-05-18 PROCEDURE — 80305 DRUG TEST PRSMV DIR OPT OBS: CPT | Performed by: NURSE PRACTITIONER

## 2023-05-18 RX ORDER — BUPRENORPHINE HYDROCHLORIDE AND NALOXONE HYDROCHLORIDE DIHYDRATE 2; .5 MG/1; MG/1
1 TABLET SUBLINGUAL DAILY
Qty: 28 TABLET | Refills: 0 | Status: SHIPPED | OUTPATIENT
Start: 2023-05-18 | End: 2023-06-15

## 2023-05-18 NOTE — PROGRESS NOTES
Verbal order per Mya Ferguson CNP for urine drug screen. Positive for BUP. Verified results with Vanessa Menjivar LPN.
Social Connections: Not on file   Intimate Partner Violence: Not on file   Housing Stability: Not on file         Current Outpatient Medications on File Prior to Visit   Medication Sig Dispense Refill    escitalopram (LEXAPRO) 20 MG tablet Take 1 tablet by mouth once daily 30 tablet 3    buprenorphine-naloxone (SUBOXONE) 2-0.5 MG SUBL Place 1 tablet under the tongue daily for 28 days. 28 tablet 0     No current facility-administered medications on file prior to visit. Vitals:    05/18/23 1636   BP: 138/86   Pulse: 86   Resp: 16        Cognition: alert, oriented to person, place, and time  Appearance: appropriate, no acute distress, does not appear intoxicated or in withdrawal  Memory: Normal  Behavioral/motor: normal  Affect: congruent  Attitude toward examiner: respectful, pleasant  Thought content: no delusions, hallucination, Denies suicidal ideation or intent  Insight: fair  Judgement: fair  Eyes: pupils normal  Skin: no rashes, no track marks noted      PDMP Monitoring:    Last PDMP Alley Castañeda as Reviewed [facility]):  Review User Review Instant Review Result   Arianna Flores 3/13/2023  4:22 PM Reviewed PDMP [1]         I reviewed the [place] Automated Rx Reporting System report     There does not appear to be any discrepancies or overprescribing of controlled substances    GOAL:  To enhance patient recovery through the use of medication assisted treatment to improve overall quality of life. OBJECTIVE:  Patient will abstain from the use of mood altering substances 7 out of 7 days per week. INTERVENTION:  Patient will be maintained on suboxone medication.   The importance of combining medical assisted treatment with comprehensive treatment including counseling, support groups, and psychiatry as applicable was discussed with patient    Orders Placed This Encounter   Procedures    POCT Rapid Drug Screen        VANNA Cuellar CNP 05/18/23 4:50 PM

## 2023-06-19 ENCOUNTER — OFFICE VISIT (OUTPATIENT)
Dept: INTERNAL MEDICINE CLINIC | Age: 43
End: 2023-06-19
Payer: COMMERCIAL

## 2023-06-19 VITALS
HEART RATE: 68 BPM | SYSTOLIC BLOOD PRESSURE: 124 MMHG | HEIGHT: 67 IN | WEIGHT: 162 LBS | BODY MASS INDEX: 25.43 KG/M2 | DIASTOLIC BLOOD PRESSURE: 89 MMHG

## 2023-06-19 DIAGNOSIS — F11.20 SEVERE OPIOID USE DISORDER (HCC): Primary | ICD-10-CM

## 2023-06-19 PROCEDURE — 80305 DRUG TEST PRSMV DIR OPT OBS: CPT | Performed by: NURSE PRACTITIONER

## 2023-06-19 PROCEDURE — 99214 OFFICE O/P EST MOD 30 MIN: CPT | Performed by: NURSE PRACTITIONER

## 2023-06-19 RX ORDER — BUPRENORPHINE HYDROCHLORIDE AND NALOXONE HYDROCHLORIDE DIHYDRATE 2; .5 MG/1; MG/1
1 TABLET SUBLINGUAL DAILY
Qty: 28 TABLET | Refills: 0 | Status: SHIPPED | OUTPATIENT
Start: 2023-06-19 | End: 2023-07-17

## 2023-06-19 ASSESSMENT — ENCOUNTER SYMPTOMS
ABDOMINAL PAIN: 0
DIARRHEA: 0
VOMITING: 0
SHORTNESS OF BREATH: 0
COUGH: 0
CONSTIPATION: 0
CHEST TIGHTNESS: 0
WHEEZING: 0
NAUSEA: 0

## 2023-06-19 NOTE — PROGRESS NOTES
Verbal order per Kostas Pereira CNP for urine drug screen. Positive for BUP THC. Verified results with Michael Gallagher. CNP.
Strain: Not on file   Food Insecurity: Not on file   Transportation Needs: Not on file   Physical Activity: Not on file   Stress: Not on file   Social Connections: Not on file   Intimate Partner Violence: Not on file   Housing Stability: Not on file         Current Outpatient Medications on File Prior to Visit   Medication Sig Dispense Refill    escitalopram (LEXAPRO) 20 MG tablet Take 1 tablet by mouth once daily 30 tablet 3     No current facility-administered medications on file prior to visit. Review of Systems   Constitutional: Negative. Respiratory:  Negative for cough, chest tightness, shortness of breath and wheezing. Cardiovascular:  Negative for chest pain and palpitations. Gastrointestinal:  Negative for abdominal pain, constipation, diarrhea, nausea and vomiting. Musculoskeletal:  Negative for arthralgias, gait problem and myalgias. Neurological:  Negative for dizziness, tremors, syncope, speech difficulty, weakness and headaches. Hematological: Negative. Psychiatric/Behavioral: Negative.            Vitals:    06/19/23 1650   BP: 124/89   Pulse: 68        Cognition: alert, oriented to person, place, and time  Appearance: appropriate, no acute distress, does not appear intoxicated or in withdrawal  Memory: Normal  Behavioral/motor: normal  Affect: congruent  Attitude toward examiner: respectful, pleasant  Thought content: no delusions, hallucination, Denies suicidal ideation or intent  Insight: fair  Judgement: fair  Eyes: pupils normal  Skin: no rashes, no track marks noted        Patient Active Problem List   Diagnosis    Depression with anxiety       PDMP Monitoring:    Last PDMP Ildefonso as Reviewed [facility]):  Review User Review Instant Review Result   [employer] 6/19/2023  4:54 PM Reviewed PDMP [1]       I reviewed the [place] Automated Rx Reporting System report     There does not appear to be any discrepancies or overprescribing of controlled substances    GOAL:  To enhance

## 2023-07-20 ENCOUNTER — OFFICE VISIT (OUTPATIENT)
Dept: INTERNAL MEDICINE CLINIC | Age: 43
End: 2023-07-20
Payer: COMMERCIAL

## 2023-07-20 VITALS
RESPIRATION RATE: 16 BRPM | HEART RATE: 71 BPM | BODY MASS INDEX: 25.9 KG/M2 | SYSTOLIC BLOOD PRESSURE: 118 MMHG | WEIGHT: 165 LBS | DIASTOLIC BLOOD PRESSURE: 65 MMHG | HEIGHT: 67 IN

## 2023-07-20 DIAGNOSIS — F11.20 SEVERE OPIOID USE DISORDER (HCC): ICD-10-CM

## 2023-07-20 PROCEDURE — 80305 DRUG TEST PRSMV DIR OPT OBS: CPT | Performed by: NURSE PRACTITIONER

## 2023-07-20 PROCEDURE — 99214 OFFICE O/P EST MOD 30 MIN: CPT | Performed by: NURSE PRACTITIONER

## 2023-07-20 RX ORDER — BUPRENORPHINE HYDROCHLORIDE AND NALOXONE HYDROCHLORIDE DIHYDRATE 2; .5 MG/1; MG/1
0.5 TABLET SUBLINGUAL DAILY
Qty: 14 TABLET | Refills: 0 | Status: SHIPPED | OUTPATIENT
Start: 2023-07-20 | End: 2023-08-17

## 2023-07-20 NOTE — PROGRESS NOTES
07/20/23   The patients primary care physician is Addie Miles DO    Rebecca Villalta is a 39 y.o.  male who presents in office today for follow up medication assisted treatment, substance use disorder. Pt established with Dr Alexandr Glover since 11/24/2020     Pt denies any use of illicit substances other than THC since 2012    Pt denies any urges, triggers, or cravings. Depression managed well with lexapro    UDS acceptable    She has been taking 1mg suboxone daily with no sx of withdrawal or cravings. We will continue this for additional month and consider taking completely off or every other day dosing at next visit. She weaned in the past and was off suboxone for approx 1 month. She then began havings cravings. Pt states she didn't taper at that time and just quit cold turkey. Pertinent Drug History  Patient has had problems using pain pills- taking on average 10 Vicodin or percocets per day  Patient started using pain pills after an injury  A friend had given her a pain pill and she said it gave her energy  Patient uses it she usually would swallow them but at the end she was snorting them  Other drugs used: No  She weaned her off of Suboxone from November 2021 to February 2022  She got down to 1 mg and then got off in February  She had major urges and cravings we had to put her back on        Social History     Socioeconomic History    Marital status: Unknown     Spouse name: Not on file    Number of children: Not on file    Years of education: Not on file    Highest education level: Not on file   Occupational History    Not on file   Tobacco Use    Smoking status: Every Day     Packs/day: 0.25     Types: Cigarettes    Smokeless tobacco: Never   Substance and Sexual Activity    Alcohol use:  Yes     Alcohol/week: 2.0 standard drinks     Types: 2 Shots of liquor per week    Drug use: Yes     Types: Marijuana (Adolm Sang), Opiates      Comment: last used percocet and vicodin 2013    Sexual

## 2023-07-20 NOTE — PROGRESS NOTES
Verbal order per Kayleen Sanchez CNP for urine drug screen. Positive for BUP, THC. Verified results with Brooklynn Martinez LPN.

## 2023-08-17 ENCOUNTER — TELEMEDICINE (OUTPATIENT)
Dept: INTERNAL MEDICINE CLINIC | Age: 43
End: 2023-08-17
Payer: COMMERCIAL

## 2023-08-17 DIAGNOSIS — F11.20 SEVERE OPIOID USE DISORDER (HCC): ICD-10-CM

## 2023-08-17 PROCEDURE — 99212 OFFICE O/P EST SF 10 MIN: CPT | Performed by: NURSE PRACTITIONER

## 2023-08-17 RX ORDER — BUPRENORPHINE HYDROCHLORIDE AND NALOXONE HYDROCHLORIDE DIHYDRATE 2; .5 MG/1; MG/1
0.5 TABLET SUBLINGUAL DAILY
Qty: 14 TABLET | Refills: 0 | Status: SHIPPED | OUTPATIENT
Start: 2023-08-17 | End: 2023-09-14

## 2023-08-17 NOTE — PROGRESS NOTES
Ryder Gaines (:  1980) is a Established patient, presenting virtually for evaluation of the following: medication assisted treatment, substance use disorder. Pt established with Dr uHnter Foster since 2020    Pt states she quit Tarri Brock. 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 She has ins coverage through her . Pt states she feels great on the suboxone 1mg. Pt states she has been sober for 10 years with absolutely no thoughts of every using again. She is considering going to every other day dosing  Mood has been stable. Will continue current medication regimen    Assessment & Plan   Below is the assessment and plan developed based on review of pertinent history, physical exam, labs, studies, and medications. 1. Severe opioid use disorder (HCC)  -     buprenorphine-naloxone (SUBOXONE) 2-0.5 MG SUBL; Place 0.5 tablets under the tongue daily for 28 days. Max Daily Amount: 0.5 tablets, Disp-14 tablet, R-0Normal    Return in about 4 weeks (around 2023). Rydre Gaines, was evaluated through a audio encounter. The patient (or guardian if applicable) is aware that this is a billable service, which includes applicable co-pays. This Virtual Visit was conducted with patient's (and/or legal guardian's) consent. Patient identification was verified, and a caregiver was present when appropriate.    The patient was located at Home: [address] [place] [address]  Provider was located at [facility] ([address] [place] [address]): [address] [place] [address]  [address] [place] [address],  [address] [place] [address], APRN - [facility]

## 2023-09-18 ENCOUNTER — TELEPHONE (OUTPATIENT)
Dept: INTERNAL MEDICINE CLINIC | Age: 43
End: 2023-09-18

## 2023-09-18 ENCOUNTER — NURSE ONLY (OUTPATIENT)
Dept: INTERNAL MEDICINE CLINIC | Age: 43
End: 2023-09-18
Payer: COMMERCIAL

## 2023-09-18 VITALS
HEART RATE: 88 BPM | WEIGHT: 158 LBS | HEIGHT: 67 IN | SYSTOLIC BLOOD PRESSURE: 139 MMHG | DIASTOLIC BLOOD PRESSURE: 89 MMHG | BODY MASS INDEX: 24.8 KG/M2

## 2023-09-18 DIAGNOSIS — F11.20 SEVERE OPIOID USE DISORDER (HCC): Primary | ICD-10-CM

## 2023-09-18 PROCEDURE — 80305 DRUG TEST PRSMV DIR OPT OBS: CPT | Performed by: NURSE PRACTITIONER

## 2023-09-18 RX ORDER — BUPRENORPHINE HYDROCHLORIDE AND NALOXONE HYDROCHLORIDE DIHYDRATE 2; .5 MG/1; MG/1
0.5 TABLET SUBLINGUAL DAILY
Qty: 14 TABLET | Refills: 0 | OUTPATIENT
Start: 2023-09-18 | End: 2023-10-16

## 2023-09-18 NOTE — PROGRESS NOTES
Verbal order per Susana Garcia CNP for urine drug screen. Positive for BUP THC. Verified results with Estella OVIEDO LPN.

## 2023-09-18 NOTE — TELEPHONE ENCOUNTER
LPN called in script of Suboxone 2 mg tab - 0.5 tab daily for 28 days qty 14 into [facility] in [place], [place]. Spoke with Radha Crabtree
No

## 2023-10-17 ENCOUNTER — OFFICE VISIT (OUTPATIENT)
Dept: INTERNAL MEDICINE CLINIC | Age: 43
End: 2023-10-17
Payer: COMMERCIAL

## 2023-10-17 VITALS
RESPIRATION RATE: 16 BRPM | WEIGHT: 155 LBS | BODY MASS INDEX: 24.33 KG/M2 | HEART RATE: 73 BPM | HEIGHT: 67 IN | SYSTOLIC BLOOD PRESSURE: 125 MMHG | DIASTOLIC BLOOD PRESSURE: 75 MMHG

## 2023-10-17 DIAGNOSIS — F11.20 SEVERE OPIOID USE DISORDER (HCC): Primary | ICD-10-CM

## 2023-10-17 PROCEDURE — 99214 OFFICE O/P EST MOD 30 MIN: CPT | Performed by: NURSE PRACTITIONER

## 2023-10-17 PROCEDURE — 80305 DRUG TEST PRSMV DIR OPT OBS: CPT | Performed by: NURSE PRACTITIONER

## 2023-10-17 RX ORDER — ATOMOXETINE 40 MG/1
40 CAPSULE ORAL DAILY
Qty: 30 CAPSULE | Refills: 0 | Status: SHIPPED | OUTPATIENT
Start: 2023-10-17 | End: 2023-11-16

## 2023-10-17 RX ORDER — BUPRENORPHINE HYDROCHLORIDE AND NALOXONE HYDROCHLORIDE DIHYDRATE 2; .5 MG/1; MG/1
0.5 TABLET SUBLINGUAL DAILY
Qty: 14 TABLET | Refills: 0 | Status: SHIPPED | OUTPATIENT
Start: 2023-10-17 | End: 2023-11-14

## 2023-10-17 ASSESSMENT — PATIENT HEALTH QUESTIONNAIRE - PHQ9
SUM OF ALL RESPONSES TO PHQ QUESTIONS 1-9: 0
SUM OF ALL RESPONSES TO PHQ QUESTIONS 1-9: 0
1. LITTLE INTEREST OR PLEASURE IN DOING THINGS: 0
SUM OF ALL RESPONSES TO PHQ QUESTIONS 1-9: 0
2. FEELING DOWN, DEPRESSED OR HOPELESS: 0
SUM OF ALL RESPONSES TO PHQ9 QUESTIONS 1 & 2: 0
SUM OF ALL RESPONSES TO PHQ QUESTIONS 1-9: 0

## 2023-10-17 NOTE — PROGRESS NOTES
10/17/23   The patients primary care physician is Ysabel Vásquez DO    Alexander Carcamo is a 39 y.o.  female who presents in office today for follow up medication assisted treatment, substance use disorder. Pt established with Dr Silvia Nevarez since 11/24/2020    Pt denies any urges, triggers, or cravings. Pt states she has noticed increased forgetfullness, lack of focus, and difficulty concentrating  Considered wellbutrin- discussed with pcp but other family members did not react well when they took it so she is worried to try  Speech is rapid with flight of ideas. She does not appear intoxicated. - hyperactive  Will try strattera    UDS acceptable      Pertinent Drug History  Patient has had problems using pain pills- taking on average 10 Vicodin or percocets per day  Patient started using pain pills after an injury  A friend had given her a pain pill and she said it gave her energy  Patient uses it she usually would swallow them but at the end she was snorting them  Other drugs used: No  She weaned her off of Suboxone from November 2021 to February 2022  She got down to 1 mg and then got off in February  She had major urges and cravings we had to put her back on      Social History     Socioeconomic History    Marital status: Unknown     Spouse name: Not on file    Number of children: Not on file    Years of education: Not on file    Highest education level: Not on file   Occupational History    Not on file   Tobacco Use    Smoking status: Every Day     Packs/day: .25     Types: Cigarettes    Smokeless tobacco: Never   Substance and Sexual Activity    Alcohol use:  Yes     Alcohol/week: 2.0 standard drinks of alcohol     Types: 2 Shots of liquor per week    Drug use: Yes     Types: Marijuana (Sanjeev Macadamia), Opiates      Comment: last used percocet and vicodin 2013    Sexual activity: Yes     Partners: Male   Other Topics Concern    Not on file   Social History Narrative    Not on file     Social

## 2023-10-17 NOTE — PROGRESS NOTES
Patient updated PHQ-9 depression screening. Score:6  Patient identifies the following symptoms, trouble falling/ staying asleep, feeling tired, having little energy, being so fidgety that others notice. Provider updated. Paper copy scanned into chart.

## 2023-10-17 NOTE — PROGRESS NOTES
Verbal order per Ariadna Ng CNP for urine drug screen. Positive for BUP, THC. Verified results with Kimberly Qureshi LPN.

## 2023-10-19 DIAGNOSIS — F11.20 SEVERE OPIOID USE DISORDER (HCC): ICD-10-CM

## 2023-10-19 RX ORDER — ESCITALOPRAM OXALATE 20 MG/1
20 TABLET ORAL DAILY
Qty: 30 TABLET | Refills: 3 | Status: SHIPPED | OUTPATIENT
Start: 2023-10-19

## 2023-10-19 NOTE — TELEPHONE ENCOUNTER
Last visit- 10/17/2023  Next visit- 11/14/2023    Requested Prescriptions     Pending Prescriptions Disp Refills    escitalopram (LEXAPRO) 20 MG tablet 30 tablet 3     Sig: Take 1 tablet by mouth daily

## 2023-11-14 ENCOUNTER — TELEPHONE (OUTPATIENT)
Dept: INTERNAL MEDICINE CLINIC | Age: 43
End: 2023-11-14

## 2023-11-14 ENCOUNTER — TELEMEDICINE (OUTPATIENT)
Dept: INTERNAL MEDICINE CLINIC | Age: 43
End: 2023-11-14
Payer: COMMERCIAL

## 2023-11-14 DIAGNOSIS — F11.20 SEVERE OPIOID USE DISORDER (HCC): ICD-10-CM

## 2023-11-14 PROCEDURE — 99214 OFFICE O/P EST MOD 30 MIN: CPT | Performed by: NURSE PRACTITIONER

## 2023-11-14 RX ORDER — BUPRENORPHINE HYDROCHLORIDE AND NALOXONE HYDROCHLORIDE DIHYDRATE 2; .5 MG/1; MG/1
0.5 TABLET SUBLINGUAL DAILY
Qty: 14 TABLET | Refills: 0 | Status: SHIPPED | OUTPATIENT
Start: 2023-11-14 | End: 2023-12-12

## 2023-11-14 RX ORDER — ATOMOXETINE 40 MG/1
40 CAPSULE ORAL DAILY
Qty: 30 CAPSULE | Refills: 0 | Status: CANCELLED | OUTPATIENT
Start: 2023-11-14 | End: 2023-12-14

## 2023-11-14 NOTE — PROGRESS NOTES
11/14/23   The patients primary care physician is Addie Miles., DO Rebecca Villalta is a 39 y.o.  female who presents in office today for follow up medication assisted treatment, substance use disorder. Pt established with Dr Alexandr Glover since 11/24/2020     Pt denies any urges, triggers, or cravings. UDS acceptable    Awaiting PA for strattera    Pertinent Drug History  Patient has had problems using pain pills- taking on average 10 Vicodin or percocets per day  Patient started using pain pills after an injury  A friend had given her a pain pill and she said it gave her energy  Patient uses it she usually would swallow them but at the end she was snorting them  Other drugs used: No  She weaned her off of Suboxone from November 2021 to February 2022  She got down to 1 mg and then got off in February  She had major urges and cravings we had to put her back on      Social History     Socioeconomic History    Marital status: Unknown     Spouse name: Not on file    Number of children: Not on file    Years of education: Not on file    Highest education level: Not on file   Occupational History    Not on file   Tobacco Use    Smoking status: Every Day     Packs/day: .25     Types: Cigarettes    Smokeless tobacco: Never   Substance and Sexual Activity    Alcohol use:  Yes     Alcohol/week: 2.0 standard drinks of alcohol     Types: 2 Shots of liquor per week    Drug use: Yes     Types: Marijuana (Adolm Sang), Opiates      Comment: last used percocet and vicodin 2013    Sexual activity: Yes     Partners: Male   Other Topics Concern    Not on file   Social History Narrative    Not on file     Social Determinants of Health     Financial Resource Strain: Not on file   Food Insecurity: Not on file   Transportation Needs: Not on file   Physical Activity: Not on file   Stress: Not on file   Social Connections: Not on file   Intimate Partner Violence: Not on file   Housing Stability: Not on file         Current

## 2023-11-14 NOTE — TELEPHONE ENCOUNTER
Called [facility] gabby for a follow-up on PA for strattera that was done over cover my meds and they stated I will not get a determination until tomorrow the latest. Called patient and let her know and she is aware.

## 2023-12-11 RX ORDER — ATOMOXETINE 40 MG/1
40 CAPSULE ORAL DAILY
Qty: 30 CAPSULE | Refills: 0 | Status: SHIPPED | OUTPATIENT
Start: 2023-12-11 | End: 2023-12-12 | Stop reason: SDUPTHER

## 2023-12-12 ENCOUNTER — OFFICE VISIT (OUTPATIENT)
Dept: INTERNAL MEDICINE CLINIC | Age: 43
End: 2023-12-12
Payer: COMMERCIAL

## 2023-12-12 VITALS
WEIGHT: 159 LBS | SYSTOLIC BLOOD PRESSURE: 141 MMHG | DIASTOLIC BLOOD PRESSURE: 77 MMHG | RESPIRATION RATE: 16 BRPM | BODY MASS INDEX: 24.9 KG/M2 | HEART RATE: 108 BPM

## 2023-12-12 DIAGNOSIS — F11.20 SEVERE OPIOID USE DISORDER (HCC): Primary | ICD-10-CM

## 2023-12-12 PROCEDURE — 99214 OFFICE O/P EST MOD 30 MIN: CPT | Performed by: NURSE PRACTITIONER

## 2023-12-12 PROCEDURE — 80305 DRUG TEST PRSMV DIR OPT OBS: CPT | Performed by: NURSE PRACTITIONER

## 2023-12-12 RX ORDER — ATOMOXETINE 40 MG/1
40 CAPSULE ORAL DAILY
Qty: 30 CAPSULE | Refills: 0 | Status: SHIPPED | OUTPATIENT
Start: 2023-12-12

## 2023-12-12 RX ORDER — BUPRENORPHINE HYDROCHLORIDE AND NALOXONE HYDROCHLORIDE DIHYDRATE 2; .5 MG/1; MG/1
0.5 TABLET SUBLINGUAL DAILY
Qty: 14 TABLET | Refills: 0 | Status: SHIPPED | OUTPATIENT
Start: 2023-12-12 | End: 2024-01-09

## 2023-12-12 NOTE — PROGRESS NOTES
12/12/23   The patients primary care physician is Ivanna Smith DO    Dhaval Prater is a 39 y.o.  female who presents in office today for follow up medication assisted treatment, substance use disorder. Pt denies any urges, triggers, or cravings. Pt states she is doing very well on strattera, states initially it seemed to speed her up, a little too much but states she now feels its leveled out and working well. Focus has improved, able to concentrate better, resolved daytime fatigue. UDS acceptable    Pertinent Drug History  Patient has had problems using pain pills- taking on average 10 Vicodin or percocets per day  Patient started using pain pills after an injury  A friend had given her a pain pill and she said it gave her energy  Patient uses it she usually would swallow them but at the end she was snorting them  Other drugs used: No  She weaned her off of Suboxone from November 2021 to February 2022  She got down to 1 mg and then got off in February  She had major urges and cravings we had to put her back on        Social History     Socioeconomic History    Marital status:      Spouse name: Not on file    Number of children: Not on file    Years of education: Not on file    Highest education level: Not on file   Occupational History    Not on file   Tobacco Use    Smoking status: Every Day     Packs/day: .25     Types: Cigarettes    Smokeless tobacco: Never   Substance and Sexual Activity    Alcohol use:  Yes     Alcohol/week: 2.0 standard drinks of alcohol     Types: 2 Shots of liquor per week    Drug use: Yes     Types: Marijuana (Marijane Parminder), Opiates      Comment: last used percocet and vicodin 2013    Sexual activity: Yes     Partners: Male   Other Topics Concern    Not on file   Social History Narrative    Not on file     Social Determinants of Health     Financial Resource Strain: Not on file   Food Insecurity: Not on file   Transportation Needs: Not on file   Physical

## 2024-01-10 ENCOUNTER — TELEMEDICINE (OUTPATIENT)
Dept: INTERNAL MEDICINE CLINIC | Age: 44
End: 2024-01-10
Payer: COMMERCIAL

## 2024-01-10 DIAGNOSIS — F11.20 SEVERE OPIOID USE DISORDER (HCC): ICD-10-CM

## 2024-01-10 PROCEDURE — 99212 OFFICE O/P EST SF 10 MIN: CPT | Performed by: NURSE PRACTITIONER

## 2024-01-10 RX ORDER — ATOMOXETINE 40 MG/1
40 CAPSULE ORAL DAILY
Qty: 30 CAPSULE | Refills: 0 | Status: SHIPPED | OUTPATIENT
Start: 2024-01-10

## 2024-01-10 RX ORDER — BUPRENORPHINE HYDROCHLORIDE AND NALOXONE HYDROCHLORIDE DIHYDRATE 2; .5 MG/1; MG/1
0.5 TABLET SUBLINGUAL DAILY
Qty: 14 TABLET | Refills: 0 | Status: SHIPPED | OUTPATIENT
Start: 2024-01-10 | End: 2024-02-07

## 2024-01-10 RX ORDER — ESCITALOPRAM OXALATE 20 MG/1
20 TABLET ORAL DAILY
Qty: 30 TABLET | Refills: 3 | Status: SHIPPED | OUTPATIENT
Start: 2024-01-10

## 2024-01-10 NOTE — PROGRESS NOTES
Ana Beltran, was evaluated through a audio encounter. The patient (or guardian if applicable) is aware that this is a billable service, which includes applicable co-pays. This Virtual Visit was conducted with patient's (and/or legal guardian's) consent. Patient identification was verified, and a caregiver was present when appropriate.   The patient was located at Home: [address] [place] [id number]  Provider was located at [place] [facility] [place]      Ana Beltran (:  1980) is a Established patient, presenting virtually for evaluation of the following:medication assisted treatment, substance use disorder.      Pt denies any urges, triggers, or cravings.     Pt states she is doing very well on all her medication. Does not feel the need for any adjustments.     Assessment & Plan   Below is the assessment and plan developed based on review of pertinent history, physical exam, labs, studies, and medications.  1. Severe opioid use disorder (HCC)  -     buprenorphine-naloxone (SUBOXONE) 2-0.5 MG SUBL; Place 0.5 tablets under the tongue daily for 28 days. Max Daily Amount: 0.5 tablets, Disp-14 tablet, R-0Normal    Return in about 27 days (around 2024), or 145.                    --VANNA Ware - CNP

## 2024-02-07 ENCOUNTER — OFFICE VISIT (OUTPATIENT)
Dept: INTERNAL MEDICINE CLINIC | Age: 44
End: 2024-02-07
Payer: COMMERCIAL

## 2024-02-07 VITALS
SYSTOLIC BLOOD PRESSURE: 132 MMHG | WEIGHT: 158 LBS | DIASTOLIC BLOOD PRESSURE: 83 MMHG | BODY MASS INDEX: 24.75 KG/M2 | RESPIRATION RATE: 16 BRPM | HEART RATE: 81 BPM

## 2024-02-07 DIAGNOSIS — F41.9 ANXIETY: ICD-10-CM

## 2024-02-07 DIAGNOSIS — Z11.59 ENCOUNTER FOR HEPATITIS C SCREENING TEST FOR LOW RISK PATIENT: ICD-10-CM

## 2024-02-07 DIAGNOSIS — Z11.4 SCREENING FOR HIV (HUMAN IMMUNODEFICIENCY VIRUS): ICD-10-CM

## 2024-02-07 DIAGNOSIS — Z13.220 SCREENING FOR CHOLESTEROL LEVEL: ICD-10-CM

## 2024-02-07 DIAGNOSIS — F11.20 SEVERE OPIOID USE DISORDER (HCC): Primary | ICD-10-CM

## 2024-02-07 DIAGNOSIS — F41.8 DEPRESSION WITH ANXIETY: ICD-10-CM

## 2024-02-07 PROCEDURE — 80305 DRUG TEST PRSMV DIR OPT OBS: CPT | Performed by: NURSE PRACTITIONER

## 2024-02-07 PROCEDURE — 99214 OFFICE O/P EST MOD 30 MIN: CPT | Performed by: NURSE PRACTITIONER

## 2024-02-07 PROCEDURE — G2211 COMPLEX E/M VISIT ADD ON: HCPCS | Performed by: NURSE PRACTITIONER

## 2024-02-07 RX ORDER — BUPRENORPHINE HYDROCHLORIDE AND NALOXONE HYDROCHLORIDE DIHYDRATE 2; .5 MG/1; MG/1
0.5 TABLET SUBLINGUAL DAILY
Qty: 14 TABLET | Refills: 0 | Status: SHIPPED | OUTPATIENT
Start: 2024-02-07 | End: 2024-03-06

## 2024-02-07 RX ORDER — ATOMOXETINE 25 MG/1
25 CAPSULE ORAL DAILY
Qty: 30 CAPSULE | Refills: 3 | Status: SHIPPED | OUTPATIENT
Start: 2024-02-07

## 2024-02-07 RX ORDER — ATOMOXETINE 40 MG/1
40 CAPSULE ORAL DAILY
Qty: 30 CAPSULE | Refills: 0 | Status: CANCELLED | OUTPATIENT
Start: 2024-02-07

## 2024-02-07 NOTE — PROGRESS NOTES
Verbal order per Colette SANCHEZ CNP for urine drug screen. Positive for AMP BUP THC. Verified results with Estella OVIEDO LPN.

## 2024-02-07 NOTE — PROGRESS NOTES
02/07/24   The patients primary care physician is Shemar Coronel Jr., DO    Ana Beltran is a 43 y.o.  female who presents in office today for follow up medication assisted treatment, substance use disorder.     Pt denies any urges, triggers, or cravings.     UDS acceptable- Positive for AMP BUP THC     Feels strattera dosing is too high, making her hyperactive, unable to sleep, and talking fast. She does feel it helps with focus and concentration. Pt request dose be decreased.     Discussed need for labs- states she just started new pcp with [facility]  Agrees to complete prior to next visit    Pertinent Drug History  Patient has had problems using pain pills- taking on average 10 Vicodin or percocets per day  Patient started using pain pills after an injury  A friend had given her a pain pill and she said it gave her energy  Patient uses it she usually would swallow them but at the end she was snorting them  Other drugs used: No  She weaned her off of Suboxone from November 2021 to February 2022  She got down to 1 mg and then got off in February  She had major urges and cravings we had to put her back on        Social History     Socioeconomic History    Marital status:      Spouse name: Not on file    Number of children: Not on file    Years of education: Not on file    Highest education level: Not on file   Occupational History    Not on file   Tobacco Use    Smoking status: Every Day     Current packs/day: 0.25     Types: Cigarettes    Smokeless tobacco: Never   Substance and Sexual Activity    Alcohol use: Yes     Alcohol/week: 2.0 standard drinks of alcohol     Types: 2 Shots of liquor per week    Drug use: Yes     Types: Marijuana (Weed), Opiates      Comment: last used percocet and vicodin 2013    Sexual activity: Yes     Partners: Male   Other Topics Concern    Not on file   Social History Narrative    Not on file     Social Determinants of Health     Financial Resource Strain: Not

## 2024-02-12 ENCOUNTER — TELEPHONE (OUTPATIENT)
Dept: INTERNAL MEDICINE CLINIC | Age: 44
End: 2024-02-12

## 2024-02-12 NOTE — TELEPHONE ENCOUNTER
This nurse called patient and let her know that she needed to come in for a urine drug screen. Patient will be in tomorrow morning to provide urine. We will need to witness drug screen and send it out when patient comes in.

## 2024-02-13 ENCOUNTER — NURSE ONLY (OUTPATIENT)
Dept: INTERNAL MEDICINE CLINIC | Age: 44
End: 2024-02-13
Payer: COMMERCIAL

## 2024-02-13 VITALS
HEIGHT: 67 IN | WEIGHT: 158 LBS | HEART RATE: 88 BPM | SYSTOLIC BLOOD PRESSURE: 135 MMHG | BODY MASS INDEX: 24.8 KG/M2 | DIASTOLIC BLOOD PRESSURE: 87 MMHG

## 2024-02-13 DIAGNOSIS — F11.20 SEVERE OPIOID USE DISORDER (HCC): Primary | ICD-10-CM

## 2024-02-13 PROCEDURE — 80305 DRUG TEST PRSMV DIR OPT OBS: CPT | Performed by: NURSE PRACTITIONER

## 2024-02-13 PROCEDURE — 99211 OFF/OP EST MAY X REQ PHY/QHP: CPT | Performed by: NURSE PRACTITIONER

## 2024-02-13 NOTE — PROGRESS NOTES
Verbal order per Dr. Colette SANCHEZ CNP for urine drug screen. Positive for BUP. Verified results with Estella OVIEDO LPN.    LPN witnessed urine drug screen. UDS sent out.

## 2024-03-04 RX ORDER — ATOMOXETINE 40 MG/1
40 CAPSULE ORAL DAILY
Qty: 30 CAPSULE | Refills: 0 | OUTPATIENT
Start: 2024-03-04

## 2024-03-06 ENCOUNTER — TELEMEDICINE (OUTPATIENT)
Dept: INTERNAL MEDICINE CLINIC | Age: 44
End: 2024-03-06
Payer: COMMERCIAL

## 2024-03-06 DIAGNOSIS — F11.20 SEVERE OPIOID USE DISORDER (HCC): ICD-10-CM

## 2024-03-06 PROCEDURE — 99212 OFFICE O/P EST SF 10 MIN: CPT | Performed by: NURSE PRACTITIONER

## 2024-03-06 RX ORDER — BUPRENORPHINE HYDROCHLORIDE AND NALOXONE HYDROCHLORIDE DIHYDRATE 2; .5 MG/1; MG/1
0.5 TABLET SUBLINGUAL DAILY
Qty: 14 TABLET | Refills: 0 | Status: SHIPPED | OUTPATIENT
Start: 2024-03-06 | End: 2024-04-03

## 2024-03-06 NOTE — PROGRESS NOTES
Ana Beltran, was evaluated through a  encounter. The patient (or guardian if applicable) is aware that this is a billable service, which includes applicable co-pays. This Virtual Visit was conducted with patient's (and/or legal guardian's) consent. Patient identification was verified, and a caregiver was present when appropriate.   The patient was located at Home: [address] [place] [id number]  Provider was located at Facility (Appt Dept): [address] [facility] [address]  [place] [address]      Ana Beltran (:  1980) is a Established patient, presenting virtually for evaluation of the following:medication assisted treatment, substance use disorder.     [facility] report was negative for amphetamines. She did admit to having some drinks with her sister prior to the previous patient. She does not typically drink alcohol.     Her strattera was decreased at previous visit per her request. States she is feeling much better. Denies need for any further alterations.     She has yet to complete labs- discussed    Assessment & Plan   Below is the assessment and plan developed based on review of pertinent history, physical exam, labs, studies, and medications.  1. Severe opioid use disorder (HCC)  The following orders have not been finalized:  -     buprenorphine-naloxone (SUBOXONE) 2-0.5 MG SUBL    No follow-ups on file.                    --VANNA Ware - CNP

## 2024-04-03 ENCOUNTER — OFFICE VISIT (OUTPATIENT)
Dept: INTERNAL MEDICINE CLINIC | Age: 44
End: 2024-04-03
Payer: COMMERCIAL

## 2024-04-03 VITALS
RESPIRATION RATE: 16 BRPM | SYSTOLIC BLOOD PRESSURE: 132 MMHG | WEIGHT: 157 LBS | DIASTOLIC BLOOD PRESSURE: 72 MMHG | HEART RATE: 86 BPM | HEIGHT: 67 IN | BODY MASS INDEX: 24.64 KG/M2

## 2024-04-03 DIAGNOSIS — Z13.220 SCREENING FOR CHOLESTEROL LEVEL: ICD-10-CM

## 2024-04-03 DIAGNOSIS — Z11.59 ENCOUNTER FOR HEPATITIS C SCREENING TEST FOR LOW RISK PATIENT: ICD-10-CM

## 2024-04-03 DIAGNOSIS — F41.9 ANXIETY: ICD-10-CM

## 2024-04-03 DIAGNOSIS — F11.20 SEVERE OPIOID USE DISORDER (HCC): Primary | ICD-10-CM

## 2024-04-03 DIAGNOSIS — Z11.4 SCREENING FOR HIV (HUMAN IMMUNODEFICIENCY VIRUS): ICD-10-CM

## 2024-04-03 PROCEDURE — 80305 DRUG TEST PRSMV DIR OPT OBS: CPT | Performed by: NURSE PRACTITIONER

## 2024-04-03 PROCEDURE — 99214 OFFICE O/P EST MOD 30 MIN: CPT | Performed by: NURSE PRACTITIONER

## 2024-04-03 RX ORDER — BUPRENORPHINE HYDROCHLORIDE AND NALOXONE HYDROCHLORIDE DIHYDRATE 2; .5 MG/1; MG/1
0.5 TABLET SUBLINGUAL DAILY
Qty: 14 TABLET | Refills: 0 | Status: SHIPPED | OUTPATIENT
Start: 2024-04-03 | End: 2024-05-01

## 2024-04-03 ASSESSMENT — PATIENT HEALTH QUESTIONNAIRE - PHQ9
SUM OF ALL RESPONSES TO PHQ QUESTIONS 1-9: 6
10. IF YOU CHECKED OFF ANY PROBLEMS, HOW DIFFICULT HAVE THESE PROBLEMS MADE IT FOR YOU TO DO YOUR WORK, TAKE CARE OF THINGS AT HOME, OR GET ALONG WITH OTHER PEOPLE: SOMEWHAT DIFFICULT
1. LITTLE INTEREST OR PLEASURE IN DOING THINGS: NOT AT ALL
6. FEELING BAD ABOUT YOURSELF - OR THAT YOU ARE A FAILURE OR HAVE LET YOURSELF OR YOUR FAMILY DOWN: NOT AT ALL
2. FEELING DOWN, DEPRESSED OR HOPELESS: NOT AT ALL
7. TROUBLE CONCENTRATING ON THINGS, SUCH AS READING THE NEWSPAPER OR WATCHING TELEVISION: NOT AT ALL
3. TROUBLE FALLING OR STAYING ASLEEP: NEARLY EVERY DAY
9. THOUGHTS THAT YOU WOULD BE BETTER OFF DEAD, OR OF HURTING YOURSELF: NOT AT ALL
SUM OF ALL RESPONSES TO PHQ QUESTIONS 1-9: 6
8. MOVING OR SPEAKING SO SLOWLY THAT OTHER PEOPLE COULD HAVE NOTICED. OR THE OPPOSITE, BEING SO FIGETY OR RESTLESS THAT YOU HAVE BEEN MOVING AROUND A LOT MORE THAN USUAL: MORE THAN HALF THE DAYS
5. POOR APPETITE OR OVEREATING: NOT AT ALL
SUM OF ALL RESPONSES TO PHQ9 QUESTIONS 1 & 2: 0
4. FEELING TIRED OR HAVING LITTLE ENERGY: SEVERAL DAYS

## 2024-04-03 NOTE — PROGRESS NOTES
Verbal order per JIA LOPEZ CNP for urine drug screen. Positive for BUP, THC. Verified results with TELLO SPIVEY LPN.

## 2024-04-03 NOTE — PROGRESS NOTES
04/03/24   The patients primary care physician is Shemar Coronel Jr., DO    Ana Beltran is a 44 y.o.  female who presents in office today for follow up medication assisted treatment, substance use disorder.     Pt denies any urges, triggers, or cravings. She is considering weaning to every other day dosing. Pt states she often forgets and has 7 tablets left. She is considering weaning off medication again however last time she did this she began having urges and thoughts of use. She did not relapse and came back into office.   Pt states the only time she realizes she hasn't taken her suboxone is when she wakes up t/o the night with sx of restless legs or runny nose.   Plan to wait until summer and discontinue medication- discussed comfort meds or gabapentin at bedtime for management of restless legs    UDS acceptable    Doing well on strattera    She has not yet completed labs, slip reprinted today. Staff is unable to draw in office.     Pertinent Drug History  Patient has had problems using pain pills- taking on average 10 Vicodin or percocets per day  Patient started using pain pills after an injury  A friend had given her a pain pill and she said it gave her energy  Patient uses it she usually would swallow them but at the end she was snorting them  Other drugs used: No  She weaned her off of Suboxone from November 2021 to February 2022  She got down to 1 mg and then got off in February  She had major urges and cravings we had to put her back on    Social History     Socioeconomic History    Marital status:      Spouse name: Not on file    Number of children: Not on file    Years of education: Not on file    Highest education level: Not on file   Occupational History    Not on file   Tobacco Use    Smoking status: Every Day     Current packs/day: 0.25     Types: Cigarettes    Smokeless tobacco: Never   Substance and Sexual Activity    Alcohol use: Yes     Alcohol/week: 2.0 standard drinks

## 2024-05-01 ENCOUNTER — TELEMEDICINE (OUTPATIENT)
Dept: INTERNAL MEDICINE CLINIC | Age: 44
End: 2024-05-01
Payer: COMMERCIAL

## 2024-05-01 ENCOUNTER — TELEPHONE (OUTPATIENT)
Dept: INTERNAL MEDICINE CLINIC | Age: 44
End: 2024-05-01

## 2024-05-01 DIAGNOSIS — F11.20 SEVERE OPIOID USE DISORDER (HCC): ICD-10-CM

## 2024-05-01 PROCEDURE — 99441 PR PHYS/QHP TELEPHONE EVALUATION 5-10 MIN: CPT | Performed by: NURSE PRACTITIONER

## 2024-05-01 RX ORDER — BUPRENORPHINE HYDROCHLORIDE AND NALOXONE HYDROCHLORIDE DIHYDRATE 2; .5 MG/1; MG/1
0.5 TABLET SUBLINGUAL DAILY
Qty: 14 TABLET | Refills: 0 | OUTPATIENT
Start: 2024-05-01 | End: 2024-05-29

## 2024-05-01 RX ORDER — ATOMOXETINE 25 MG/1
25 CAPSULE ORAL DAILY
Qty: 30 CAPSULE | Refills: 3 | OUTPATIENT
Start: 2024-05-01

## 2024-05-01 RX ORDER — ESCITALOPRAM OXALATE 20 MG/1
20 TABLET ORAL DAILY
Qty: 30 TABLET | Refills: 3 | Status: SHIPPED | OUTPATIENT
Start: 2024-05-01

## 2024-05-01 NOTE — TELEPHONE ENCOUNTER
VO per Colette SANCHEZ CNP-    LPN called in script of Strattera 25 mg tab daily for 30 days qty 30 R-3 and Suboxone 2-0.5 mg tab- 0.5 tab daily for for 28 days qty 14 into [facility] in [place], [place].

## 2024-05-30 ENCOUNTER — TELEPHONE (OUTPATIENT)
Dept: INTERNAL MEDICINE CLINIC | Age: 44
End: 2024-05-30

## 2024-06-04 ENCOUNTER — OFFICE VISIT (OUTPATIENT)
Dept: INTERNAL MEDICINE CLINIC | Age: 44
End: 2024-06-04
Payer: COMMERCIAL

## 2024-06-04 VITALS
SYSTOLIC BLOOD PRESSURE: 122 MMHG | HEART RATE: 85 BPM | BODY MASS INDEX: 24.48 KG/M2 | RESPIRATION RATE: 16 BRPM | HEIGHT: 67 IN | WEIGHT: 156 LBS | DIASTOLIC BLOOD PRESSURE: 63 MMHG

## 2024-06-04 DIAGNOSIS — F90.9 HYPERACTIVITY: ICD-10-CM

## 2024-06-04 DIAGNOSIS — R68.82 DECREASED SEX DRIVE: ICD-10-CM

## 2024-06-04 DIAGNOSIS — F11.20 SEVERE OPIOID USE DISORDER (HCC): Primary | ICD-10-CM

## 2024-06-04 PROCEDURE — 80305 DRUG TEST PRSMV DIR OPT OBS: CPT | Performed by: NURSE PRACTITIONER

## 2024-06-04 PROCEDURE — 99214 OFFICE O/P EST MOD 30 MIN: CPT | Performed by: NURSE PRACTITIONER

## 2024-06-04 RX ORDER — BUPRENORPHINE HYDROCHLORIDE AND NALOXONE HYDROCHLORIDE DIHYDRATE 2; .5 MG/1; MG/1
0.5 TABLET SUBLINGUAL DAILY
Qty: 14 TABLET | Refills: 0 | Status: CANCELLED | OUTPATIENT
Start: 2024-06-04 | End: 2024-07-02

## 2024-06-04 RX ORDER — BUPROPION HYDROCHLORIDE 150 MG/1
150 TABLET ORAL EVERY MORNING
Qty: 30 TABLET | Refills: 3 | Status: SHIPPED | OUTPATIENT
Start: 2024-06-04

## 2024-06-04 SDOH — ECONOMIC STABILITY: FOOD INSECURITY: WITHIN THE PAST 12 MONTHS, YOU WORRIED THAT YOUR FOOD WOULD RUN OUT BEFORE YOU GOT MONEY TO BUY MORE.: NEVER TRUE

## 2024-06-04 SDOH — ECONOMIC STABILITY: FOOD INSECURITY: WITHIN THE PAST 12 MONTHS, THE FOOD YOU BOUGHT JUST DIDN'T LAST AND YOU DIDN'T HAVE MONEY TO GET MORE.: NEVER TRUE

## 2024-06-04 SDOH — ECONOMIC STABILITY: HOUSING INSECURITY
IN THE LAST 12 MONTHS, WAS THERE A TIME WHEN YOU DID NOT HAVE A STEADY PLACE TO SLEEP OR SLEPT IN A SHELTER (INCLUDING NOW)?: NO

## 2024-06-04 SDOH — ECONOMIC STABILITY: INCOME INSECURITY: HOW HARD IS IT FOR YOU TO PAY FOR THE VERY BASICS LIKE FOOD, HOUSING, MEDICAL CARE, AND HEATING?: NOT HARD AT ALL

## 2024-06-04 ASSESSMENT — PATIENT HEALTH QUESTIONNAIRE - PHQ9
6. FEELING BAD ABOUT YOURSELF - OR THAT YOU ARE A FAILURE OR HAVE LET YOURSELF OR YOUR FAMILY DOWN: NOT AT ALL
1. LITTLE INTEREST OR PLEASURE IN DOING THINGS: SEVERAL DAYS
SUM OF ALL RESPONSES TO PHQ QUESTIONS 1-9: 4
7. TROUBLE CONCENTRATING ON THINGS, SUCH AS READING THE NEWSPAPER OR WATCHING TELEVISION: SEVERAL DAYS
SUM OF ALL RESPONSES TO PHQ QUESTIONS 1-9: 4
4. FEELING TIRED OR HAVING LITTLE ENERGY: NOT AT ALL
3. TROUBLE FALLING OR STAYING ASLEEP: SEVERAL DAYS
9. THOUGHTS THAT YOU WOULD BE BETTER OFF DEAD, OR OF HURTING YOURSELF: NOT AT ALL
10. IF YOU CHECKED OFF ANY PROBLEMS, HOW DIFFICULT HAVE THESE PROBLEMS MADE IT FOR YOU TO DO YOUR WORK, TAKE CARE OF THINGS AT HOME, OR GET ALONG WITH OTHER PEOPLE: NOT DIFFICULT AT ALL
SUM OF ALL RESPONSES TO PHQ QUESTIONS 1-9: 4
SUM OF ALL RESPONSES TO PHQ QUESTIONS 1-9: 4
2. FEELING DOWN, DEPRESSED OR HOPELESS: NOT AT ALL
8. MOVING OR SPEAKING SO SLOWLY THAT OTHER PEOPLE COULD HAVE NOTICED. OR THE OPPOSITE, BEING SO FIGETY OR RESTLESS THAT YOU HAVE BEEN MOVING AROUND A LOT MORE THAN USUAL: SEVERAL DAYS
SUM OF ALL RESPONSES TO PHQ9 QUESTIONS 1 & 2: 1
5. POOR APPETITE OR OVEREATING: NOT AT ALL

## 2024-06-04 NOTE — PROGRESS NOTES
06/05/24   The patients primary care physician is Shemar Coronel Jr.,     Ana Beltran is a 44 y.o.  female who presents in office today for follow up medication assisted treatment, substance use disorder.     Pt denies any urges, triggers, or cravings. Taking suboxone 1mg qod- wants to wean off but has concerns. Feels this works well for her and will maintain sobriety. She denies need for refill of medication, has meds at home    UDS acceptable    Pt reports she has been on celexa for an extended period of time \"several years\" now starting to feel lack of emotion and has no sex drive. States she is fine not being touched and just dont care about it. It has been several months since she was intimate.     Discussed option and concerns. She denies feelings of depression- states her thoughts of feeling bad is primarily r/t letting her boyfriend down not necessarily feeling bad about her self.  Screening completed  Admits to not taking strattera daily and takes as needed. This gives her energy when completing task. It does help with concentration and focus.   She is typically hyperverbal at most visits.   Plan- discontinue strattera, add wellbutrin for both sx r/t adhd and hope to increase sex drive  Encouraged f/u with gyn for hormone testing and possible replacement. She is not a smoker, denies contraindications for estrogen therapy    Pertinent Drug History  Patient has had problems using pain pills- taking on average 10 Vicodin or percocets per day  Patient started using pain pills after an injury  A friend had given her a pain pill and she said it gave her energy  Patient uses it she usually would swallow them but at the end she was snorting them  Other drugs used: No  She weaned her off of Suboxone from November 2021 to February 2022  She got down to 1 mg and then got off in February  She had major urges and cravings we had to put her back on      Social History     Socioeconomic History

## 2024-06-04 NOTE — PROGRESS NOTES
Verbal order per JIA LOPEZ CNP for urine drug screen. Positive for BUP, THC. Verified results with Anaya SPIVEY LPN.

## 2024-06-18 ENCOUNTER — TELEMEDICINE (OUTPATIENT)
Dept: INTERNAL MEDICINE CLINIC | Age: 44
End: 2024-06-18
Payer: COMMERCIAL

## 2024-06-18 DIAGNOSIS — F11.20 SEVERE OPIOID USE DISORDER (HCC): ICD-10-CM

## 2024-06-18 PROCEDURE — 99211 OFF/OP EST MAY X REQ PHY/QHP: CPT | Performed by: NURSE PRACTITIONER

## 2024-06-18 RX ORDER — BUPRENORPHINE HYDROCHLORIDE AND NALOXONE HYDROCHLORIDE DIHYDRATE 2; .5 MG/1; MG/1
0.5 TABLET SUBLINGUAL DAILY
Qty: 14 TABLET | Refills: 0 | Status: SHIPPED | OUTPATIENT
Start: 2024-06-18 | End: 2024-07-16

## 2024-07-16 ENCOUNTER — OFFICE VISIT (OUTPATIENT)
Dept: INTERNAL MEDICINE CLINIC | Age: 44
End: 2024-07-16
Payer: COMMERCIAL

## 2024-07-16 ENCOUNTER — LAB (OUTPATIENT)
Dept: INTERNAL MEDICINE CLINIC | Age: 44
End: 2024-07-16

## 2024-07-16 VITALS
DIASTOLIC BLOOD PRESSURE: 70 MMHG | HEART RATE: 78 BPM | SYSTOLIC BLOOD PRESSURE: 119 MMHG | HEIGHT: 67 IN | RESPIRATION RATE: 18 BRPM | WEIGHT: 159 LBS | BODY MASS INDEX: 24.96 KG/M2

## 2024-07-16 DIAGNOSIS — F11.20 SEVERE OPIOID USE DISORDER (HCC): ICD-10-CM

## 2024-07-16 DIAGNOSIS — F41.8 DEPRESSION WITH ANXIETY: ICD-10-CM

## 2024-07-16 DIAGNOSIS — Z11.4 SCREENING FOR HIV (HUMAN IMMUNODEFICIENCY VIRUS): ICD-10-CM

## 2024-07-16 DIAGNOSIS — F41.9 ANXIETY: ICD-10-CM

## 2024-07-16 DIAGNOSIS — F11.20 SEVERE OPIOID USE DISORDER (HCC): Primary | ICD-10-CM

## 2024-07-16 DIAGNOSIS — Z13.220 SCREENING FOR CHOLESTEROL LEVEL: ICD-10-CM

## 2024-07-16 DIAGNOSIS — F90.9 HYPERACTIVITY: ICD-10-CM

## 2024-07-16 DIAGNOSIS — R07.9 CHEST PAIN, UNSPECIFIED TYPE: ICD-10-CM

## 2024-07-16 DIAGNOSIS — Z11.59 ENCOUNTER FOR HEPATITIS C SCREENING TEST FOR LOW RISK PATIENT: ICD-10-CM

## 2024-07-16 LAB
ALBUMIN SERPL BCG-MCNC: 4.5 G/DL (ref 3.5–5.1)
ALCOHOL URINE: ABNORMAL
ALP SERPL-CCNC: 86 U/L (ref 38–126)
ALT SERPL W/O P-5'-P-CCNC: 18 U/L (ref 11–66)
AMPHETAMINE SCREEN URINE: ABNORMAL
ANION GAP SERPL CALC-SCNC: 10 MEQ/L (ref 8–16)
AST SERPL-CCNC: 19 U/L (ref 5–40)
BARBITURATE SCREEN URINE: ABNORMAL
BASOPHILS ABSOLUTE: 0 THOU/MM3 (ref 0–0.1)
BASOPHILS NFR BLD AUTO: 0.7 %
BENZODIAZEPINE SCREEN, URINE: ABNORMAL
BILIRUB SERPL-MCNC: 0.2 MG/DL (ref 0.3–1.2)
BUN SERPL-MCNC: 16 MG/DL (ref 7–22)
BUPRENORPHINE URINE: ABNORMAL
CALCIUM SERPL-MCNC: 9 MG/DL (ref 8.5–10.5)
CHLORIDE SERPL-SCNC: 104 MEQ/L (ref 98–111)
CHOLEST SERPL-MCNC: 217 MG/DL (ref 100–199)
CO2 SERPL-SCNC: 23 MEQ/L (ref 23–33)
COCAINE METABOLITE SCREEN URINE: ABNORMAL
CREAT SERPL-MCNC: 0.8 MG/DL (ref 0.4–1.2)
DEPRECATED RDW RBC AUTO: 41.9 FL (ref 35–45)
EOSINOPHIL NFR BLD AUTO: 1.6 %
EOSINOPHILS ABSOLUTE: 0.1 THOU/MM3 (ref 0–0.4)
ERYTHROCYTE [DISTWIDTH] IN BLOOD BY AUTOMATED COUNT: 12.6 % (ref 11.5–14.5)
FENTANYL SCREEN, URINE: ABNORMAL
GABAPENTIN SCREEN, URINE: ABNORMAL
GFR SERPL CREATININE-BSD FRML MDRD: > 90 ML/MIN/1.73M2
GLUCOSE SERPL-MCNC: 91 MG/DL (ref 70–108)
HAV IGM SER QL: NEGATIVE
HBV CORE IGM SERPL QL IA: NEGATIVE
HBV SURFACE AG SERPL QL IA: NEGATIVE
HCT VFR BLD AUTO: 39.3 % (ref 37–47)
HCV IGG SERPL QL IA: NEGATIVE
HDLC SERPL-MCNC: 69 MG/DL
HGB BLD-MCNC: 13.2 GM/DL (ref 12–16)
HIV 1+2 AB+HIV1 P24 AG SERPL QL IA: NORMAL
IMM GRANULOCYTES # BLD AUTO: 0.02 THOU/MM3 (ref 0–0.07)
IMM GRANULOCYTES NFR BLD AUTO: 0.5 %
LDLC SERPL CALC-MCNC: 128 MG/DL
LYMPHOCYTES ABSOLUTE: 1.6 THOU/MM3 (ref 1–4.8)
LYMPHOCYTES NFR BLD AUTO: 37.4 %
MAGNESIUM SERPL-MCNC: 2.1 MG/DL (ref 1.6–2.4)
MCH RBC QN AUTO: 30.4 PG (ref 26–33)
MCHC RBC AUTO-ENTMCNC: 33.6 GM/DL (ref 32.2–35.5)
MCV RBC AUTO: 90.6 FL (ref 81–99)
MDMA URINE: ABNORMAL
METHADONE SCREEN, URINE: ABNORMAL
METHAMPHETAMINE, URINE: ABNORMAL
MONOCYTES ABSOLUTE: 0.3 THOU/MM3 (ref 0.4–1.3)
MONOCYTES NFR BLD AUTO: 7.1 %
NEUTROPHILS ABSOLUTE: 2.3 THOU/MM3 (ref 1.8–7.7)
NEUTROPHILS NFR BLD AUTO: 52.7 %
NRBC BLD AUTO-RTO: 0 /100 WBC
OPIATE SCREEN URINE: ABNORMAL
OXYCODONE SCREEN URINE: ABNORMAL
PHENCYCLIDINE SCREEN URINE: ABNORMAL
PLATELET # BLD AUTO: 230 THOU/MM3 (ref 130–400)
PMV BLD AUTO: 9.2 FL (ref 9.4–12.4)
POTASSIUM SERPL-SCNC: 4.4 MEQ/L (ref 3.5–5.2)
PROPOXYPHENE SCREEN, URINE: ABNORMAL
PROT SERPL-MCNC: 7 G/DL (ref 6.1–8)
RBC # BLD AUTO: 4.34 MILL/MM3 (ref 4.2–5.4)
SODIUM SERPL-SCNC: 137 MEQ/L (ref 135–145)
SYNTHETIC CANNABINOIDS(K2) SCREEN, URINE: ABNORMAL
THC SCREEN, URINE: ABNORMAL
TRAMADOL SCREEN URINE: ABNORMAL
TRICYCLIC ANTIDEPRESSANTS, UR: ABNORMAL
TRIGL SERPL-MCNC: 100 MG/DL (ref 0–199)
TROPONIN, HIGH SENSITIVITY: < 6 NG/L (ref 0–12)
TSH SERPL DL<=0.005 MIU/L-ACNC: 1.17 UIU/ML (ref 0.4–4.2)
WBC # BLD AUTO: 4.3 THOU/MM3 (ref 4.8–10.8)

## 2024-07-16 PROCEDURE — 99214 OFFICE O/P EST MOD 30 MIN: CPT | Performed by: NURSE PRACTITIONER

## 2024-07-16 PROCEDURE — 93000 ELECTROCARDIOGRAM COMPLETE: CPT | Performed by: NURSE PRACTITIONER

## 2024-07-16 PROCEDURE — 80305 DRUG TEST PRSMV DIR OPT OBS: CPT | Performed by: NURSE PRACTITIONER

## 2024-07-16 RX ORDER — BUPRENORPHINE HYDROCHLORIDE AND NALOXONE HYDROCHLORIDE DIHYDRATE 2; .5 MG/1; MG/1
0.5 TABLET SUBLINGUAL DAILY
Qty: 14 TABLET | Refills: 0 | Status: SHIPPED | OUTPATIENT
Start: 2024-07-16 | End: 2024-08-13

## 2024-07-16 NOTE — PROGRESS NOTES
Opiates      Comment: last used percocet and vicodin 2013    Sexual activity: Yes     Partners: Male   Other Topics Concern    Not on file   Social History Narrative    Not on file     Social Determinants of Health     Financial Resource Strain: Low Risk  (6/4/2024)    Overall Financial Resource Strain (CARDIA)     Difficulty of Paying Living Expenses: Not hard at all   Food Insecurity: No Food Insecurity (6/4/2024)    Hunger Vital Sign     Worried About Running Out of Food in the Last Year: Never true     Ran Out of Food in the Last Year: Never true   Transportation Needs: Unknown (6/4/2024)    PRAPARE - Transportation     Lack of Transportation (Medical): Not on file     Lack of Transportation (Non-Medical): No   Physical Activity: Not on file   Stress: Not on file   Social Connections: Not on file   Intimate Partner Violence: Not on file   Housing Stability: Unknown (6/4/2024)    Housing Stability Vital Sign     Unable to Pay for Housing in the Last Year: Not on file     Number of Places Lived in the Last Year: Not on file     Unstable Housing in the Last Year: No         Current Outpatient Medications on File Prior to Visit   Medication Sig Dispense Refill    buprenorphine-naloxone (SUBOXONE) 2-0.5 MG SUBL Place 0.5 tablets under the tongue daily for 28 days. Max Daily Amount: 0.5 tablets 14 tablet 0    buPROPion (WELLBUTRIN XL) 150 MG extended release tablet Take 1 tablet by mouth every morning 30 tablet 3    escitalopram (LEXAPRO) 20 MG tablet Take 1 tablet by mouth daily 30 tablet 3     No current facility-administered medications on file prior to visit.           Vitals:    07/16/24 1355   BP: 119/70   Pulse: 78   Resp: 18        Cognition: alert, oriented to person, place, and time  Appearance: appropriate, no acute distress, does not appear intoxicated or in withdrawal  Memory: Normal  Behavioral/motor: normal  Affect: congruent  Attitude toward examiner: respectful, pleasant  Thought content: no

## 2024-08-13 ENCOUNTER — TELEMEDICINE (OUTPATIENT)
Dept: INTERNAL MEDICINE CLINIC | Age: 44
End: 2024-08-13

## 2024-08-13 DIAGNOSIS — F11.20 SEVERE OPIOID USE DISORDER (HCC): ICD-10-CM

## 2024-08-13 RX ORDER — BUPRENORPHINE HYDROCHLORIDE AND NALOXONE HYDROCHLORIDE DIHYDRATE 2; .5 MG/1; MG/1
0.5 TABLET SUBLINGUAL DAILY
Qty: 14 TABLET | Refills: 0 | Status: SHIPPED | OUTPATIENT
Start: 2024-08-13 | End: 2024-09-10

## 2024-08-13 RX ORDER — ESCITALOPRAM OXALATE 20 MG/1
20 TABLET ORAL DAILY
Qty: 30 TABLET | Refills: 3 | Status: SHIPPED | OUTPATIENT
Start: 2024-08-13

## 2024-08-13 NOTE — PROGRESS NOTES
Patient doing well with dose. Not having any withdrawal or cravings at this time. Patient will be in office next month for visit.

## 2024-09-11 ENCOUNTER — OFFICE VISIT (OUTPATIENT)
Dept: INTERNAL MEDICINE CLINIC | Age: 44
End: 2024-09-11
Payer: COMMERCIAL

## 2024-09-11 VITALS
RESPIRATION RATE: 18 BRPM | BODY MASS INDEX: 26.21 KG/M2 | WEIGHT: 167 LBS | DIASTOLIC BLOOD PRESSURE: 68 MMHG | HEART RATE: 69 BPM | SYSTOLIC BLOOD PRESSURE: 129 MMHG | HEIGHT: 67 IN

## 2024-09-11 DIAGNOSIS — F11.20 SEVERE OPIOID USE DISORDER (HCC): Primary | ICD-10-CM

## 2024-09-11 LAB
ALCOHOL URINE: ABNORMAL
AMPHETAMINE SCREEN URINE: ABNORMAL
BARBITURATE SCREEN URINE: ABNORMAL
BENZODIAZEPINE SCREEN, URINE: ABNORMAL
BUPRENORPHINE URINE: ABNORMAL
COCAINE METABOLITE SCREEN URINE: ABNORMAL
FENTANYL SCREEN, URINE: ABNORMAL
GABAPENTIN SCREEN, URINE: ABNORMAL
MDMA, URINE: ABNORMAL
METHADONE SCREEN, URINE: ABNORMAL
METHAMPHETAMINE, URINE: ABNORMAL
OPIATE SCREEN URINE: ABNORMAL
OXYCODONE SCREEN URINE: ABNORMAL
PHENCYCLIDINE SCREEN URINE: ABNORMAL
PROPOXYPHENE SCREEN, URINE: ABNORMAL
SYNTHETIC CANNABINOIDS(K2) SCREEN, URINE: ABNORMAL
THC SCREEN, URINE: ABNORMAL
TRAMADOL SCREEN URINE: ABNORMAL
TRICYCLIC ANTIDEPRESSANTS, UR: ABNORMAL

## 2024-09-11 PROCEDURE — 80305 DRUG TEST PRSMV DIR OPT OBS: CPT | Performed by: NURSE PRACTITIONER

## 2024-09-11 PROCEDURE — 99214 OFFICE O/P EST MOD 30 MIN: CPT | Performed by: NURSE PRACTITIONER

## 2024-09-11 RX ORDER — BUPROPION HYDROCHLORIDE 150 MG/1
150 TABLET ORAL EVERY MORNING
Qty: 30 TABLET | Refills: 3 | Status: SHIPPED | OUTPATIENT
Start: 2024-09-11

## 2024-09-11 RX ORDER — BUPROPION HYDROCHLORIDE 150 MG/1
150 TABLET ORAL EVERY MORNING
Qty: 30 TABLET | Refills: 3 | Status: CANCELLED | OUTPATIENT
Start: 2024-09-11

## 2024-09-11 RX ORDER — BUPRENORPHINE HYDROCHLORIDE AND NALOXONE HYDROCHLORIDE DIHYDRATE 2; .5 MG/1; MG/1
0.5 TABLET SUBLINGUAL DAILY
Qty: 14 TABLET | Refills: 0 | Status: SHIPPED | OUTPATIENT
Start: 2024-09-11 | End: 2024-10-09

## 2024-10-01 ENCOUNTER — OFFICE VISIT (OUTPATIENT)
Dept: CARDIOLOGY CLINIC | Age: 44
End: 2024-10-01
Payer: COMMERCIAL

## 2024-10-01 VITALS
BODY MASS INDEX: 25.9 KG/M2 | HEIGHT: 67 IN | SYSTOLIC BLOOD PRESSURE: 122 MMHG | WEIGHT: 165 LBS | DIASTOLIC BLOOD PRESSURE: 76 MMHG | HEART RATE: 88 BPM

## 2024-10-01 DIAGNOSIS — R07.9 CHEST PAIN, UNSPECIFIED TYPE: Primary | ICD-10-CM

## 2024-10-01 DIAGNOSIS — I20.9 ANGINA PECTORIS (HCC): ICD-10-CM

## 2024-10-01 PROCEDURE — 99204 OFFICE O/P NEW MOD 45 MIN: CPT | Performed by: INTERNAL MEDICINE

## 2024-10-01 NOTE — PROGRESS NOTES
New patient.    Last EKG done on 07/16/2024.    Patient states she has a family history of cardiac issues on ehr dads side. She needs clearance to be put on ADHD medication by Colette Bloom CNP.    States she has worsening anxiety.     Denies chest pain, palpitations, dizziness, shortness of breath, and edema at this time.

## 2024-10-01 NOTE — PROGRESS NOTES
[facility] [place] [facility]  [facility] CARDIOLOGY  [address]  [address]  [facility] [address]  Dept: [phone]  Dept Fax: [phone]  Loc: [phone]    Visit Date: 10/1/2024    Ms. Beltran is a 44 y.o. female  who presented for:  New referral   Chest pain   HPI:   HPI   Ana Beltran is a pleasant 44 year old female patient who has past medical history of opioid dependence (on suboxone, stopped ), anxiety. Her family history is positive for CAD. Her father had CABG at age at age 57,  from CHF. Patient was referred for chest pain. She states that chest pain, left sided, felt like pressure, did not radiate, occurred at rest. No recurrence of chest pain for past two months.       Current Outpatient Medications:     buprenorphine-naloxone (SUBOXONE) 2-0.5 MG SUBL, Place 0.5 tablets under the tongue daily for 28 days. Max Daily Amount: 0.5 tablets, Disp: 14 tablet, Rfl: 0    buPROPion (WELLBUTRIN XL) 150 MG extended release tablet, Take 1 tablet by mouth every morning, Disp: 30 tablet, Rfl: 3    escitalopram (LEXAPRO) 20 MG tablet, Take 1 tablet by mouth daily, Disp: 30 tablet, Rfl: 3    Past Medical History  Ana  has no past medical history on file.    Social History  Ana  reports that she has been smoking cigarettes. She has never used smokeless tobacco. She reports current alcohol use of about 2.0 standard drinks of alcohol per week. She reports current drug use. Drugs: Marijuana (Weed) and Opiates .    Family History  Ana family history is not on file.    Past Surgical History   No past surgical history on file.    Review of Systems   Constitutional: Negative for chills and fever  HENT: Negative for congestion, sinus pressure, sneezing and sore throat.    Eyes: Negative for pain, discharge, redness and itching.   Respiratory: Negative for apnea, cough  Gastrointestinal: Negative for blood in stool, constipation, diarrhea   Endocrine: Negative for cold

## 2024-10-09 DIAGNOSIS — F11.20 SEVERE OPIOID USE DISORDER (HCC): ICD-10-CM

## 2024-10-09 RX ORDER — BUPRENORPHINE HYDROCHLORIDE AND NALOXONE HYDROCHLORIDE DIHYDRATE 2; .5 MG/1; MG/1
0.5 TABLET SUBLINGUAL DAILY
Qty: 3 TABLET | Refills: 0 | OUTPATIENT
Start: 2024-10-09 | End: 2024-10-15

## 2024-10-09 NOTE — TELEPHONE ENCOUNTER
Patient rescheduled her appointment since she has a cardiology appointment on Friday. Medication called into pharmacy per provider verbal orders to last  until next appointment on 10/15/24.

## 2024-10-11 ENCOUNTER — HOSPITAL ENCOUNTER (OUTPATIENT)
Age: 44
Discharge: HOME | End: 2024-10-13
Attending: INTERNAL MEDICINE
Payer: COMMERCIAL

## 2024-10-11 VITALS — DIASTOLIC BLOOD PRESSURE: 76 MMHG | SYSTOLIC BLOOD PRESSURE: 122 MMHG

## 2024-10-11 VITALS — WEIGHT: 155 LBS | BODY MASS INDEX: 24.33 KG/M2 | HEIGHT: 67 IN

## 2024-10-11 DIAGNOSIS — I20.9 ANGINA PECTORIS (HCC): ICD-10-CM

## 2024-10-11 DIAGNOSIS — R07.9 CHEST PAIN, UNSPECIFIED TYPE: ICD-10-CM

## 2024-10-11 LAB
ECHO AO ASC DIAM: 2.6 CM
ECHO AO ROOT DIAM: 2.6 CM
ECHO AV CUSP MM: 1.7 CM
ECHO BSA: 1.82 M2
ECHO EST RA PRESSURE: 3 MMHG
ECHO LA AREA 2C: 16.5 CM2
ECHO LA AREA 4C: 15.1 CM2
ECHO LA VOL A-L A2C: 43 ML (ref 22–52)
ECHO LA VOL A-L A4C: 36 ML (ref 22–52)
ECHO LV E' LATERAL VELOCITY: 13.9 CM/S
ECHO LV E' SEPTAL VELOCITY: 10.6 CM/S
ECHO LV EDV A2C: 98 ML
ECHO LV EDV A4C: 83 ML
ECHO LV EDV BP: 92 ML (ref 56–104)
ECHO LV EDV TEICHHOLZ: 118 ML
ECHO LV EF PHYSICIAN: 55 %
ECHO LV EJECTION FRACTION A2C: 55 %
ECHO LV EJECTION FRACTION A4C: 51 %
ECHO LV ESV A2C: 44 ML
ECHO LV ESV A4C: 41 ML
ECHO LV ESV BP: 44 ML (ref 19–49)
ECHO LV ESV TEICHHOLZ: 38 ML
ECHO LV FRACTIONAL SHORTENING: 38 % (ref 28–44)
ECHO LV INTERNAL DIMENSION DIASTOLIC: 5 CM (ref 3.9–5.3)
ECHO LV INTERNAL DIMENSION SYSTOLIC: 3.1 CM
ECHO LV IVSD: 0.9 CM (ref 0.6–0.9)
ECHO LV MASS 2D: 158.2 G (ref 67–162)
ECHO LV POSTERIOR WALL DIASTOLIC: 0.9 CM (ref 0.6–0.9)
ECHO LV RELATIVE WALL THICKNESS RATIO: 0.36
ECHO LVOT MEAN GRADIENT: 3 MMHG
ECHO LVOT PEAK GRADIENT: 6 MMHG
ECHO LVOT PEAK VELOCITY: 1.3 M/S
ECHO LVOT VTI: 25.7 CM
ECHO MV A VELOCITY: 0.55 M/S
ECHO MV AREA PHT: 6.1 CM2
ECHO MV E VELOCITY: 0.68 M/S
ECHO MV E/A RATIO: 1.24
ECHO MV E/E' LATERAL: 4.89
ECHO MV E/E' RATIO (AVERAGED): 5.65
ECHO MV E/E' SEPTAL: 6.42
ECHO MV PRESSURE HALF TIME (PHT): 36 MS
ECHO MV REGURGITANT PEAK GRADIENT: 25 MMHG
ECHO MV REGURGITANT PEAK VELOCITY: 2.5 M/S
ECHO PV MAX VELOCITY: 0.6 M/S
ECHO RIGHT VENTRICULAR SYSTOLIC PRESSURE (RVSP): 21 MMHG
ECHO RV INTERNAL DIMENSION: 2.5 CM
ECHO RV TAPSE: 1.7 CM (ref 1.7–?)
ECHO TV A WAVE: 0.6 M/S
ECHO TV E WAVE: 0.8 M/S
ECHO TV REGURGITANT MAX VELOCITY: 2.1 M/S
ECHO TV REGURGITANT PEAK GRADIENT: 18 MMHG
STRESS BASELINE DIAS BP: 77 MMHG
STRESS BASELINE HR: 77 BPM
STRESS BASELINE SYS BP: 127 MMHG
STRESS ESTIMATED WORKLOAD: 13.4 METS
STRESS EXERCISE DUR MIN: 10 MIN
STRESS EXERCISE DUR SEC: 0 SEC
STRESS PEAK DIAS BP: 84 MMHG
STRESS PEAK SYS BP: 152 MMHG
STRESS PERCENT HR ACHIEVED: 95 %
STRESS POST PEAK HR: 166 BPM
STRESS RATE PRESSURE PRODUCT: NORMAL BPM*MMHG
STRESS STAGE 1 BP: NORMAL MMHG
STRESS STAGE 1 DURATION: 3 MIN:SEC
STRESS STAGE 1 HR: 108 BPM
STRESS STAGE 2 BP: NORMAL MMHG
STRESS STAGE 2 DURATION: 3 MIN:SEC
STRESS STAGE 2 HR: 126 BPM
STRESS STAGE 3 DURATION: 3 MIN:SEC
STRESS STAGE 3 HR: 155 BPM
STRESS STAGE 4 DURATION: 1 MIN:SEC
STRESS STAGE 4 HR: 166 BPM
STRESS STAGE RECOVERY 1 BP: NORMAL MMHG
STRESS STAGE RECOVERY 1 DURATION: 1 MIN:SEC
STRESS STAGE RECOVERY 1 HR: 142 BPM
STRESS STAGE RECOVERY 2 BP: NORMAL MMHG
STRESS STAGE RECOVERY 2 DURATION: 1 MIN:SEC
STRESS STAGE RECOVERY 2 HR: 110 BPM
STRESS STAGE RECOVERY 3 BP: NORMAL MMHG
STRESS STAGE RECOVERY 3 DURATION: 3 MIN:SEC
STRESS STAGE RECOVERY 3 HR: 100 BPM
STRESS TARGET HR: 175 BPM

## 2024-10-11 PROCEDURE — 93016 CV STRESS TEST SUPVJ ONLY: CPT | Performed by: INTERNAL MEDICINE

## 2024-10-11 PROCEDURE — 93306 TTE W/DOPPLER COMPLETE: CPT

## 2024-10-11 PROCEDURE — 93017 CV STRESS TEST TRACING ONLY: CPT

## 2024-10-11 PROCEDURE — 93306 TTE W/DOPPLER COMPLETE: CPT | Performed by: INTERNAL MEDICINE

## 2024-10-11 PROCEDURE — 93018 CV STRESS TEST I&R ONLY: CPT | Performed by: INTERNAL MEDICINE

## 2024-10-15 ENCOUNTER — OFFICE VISIT (OUTPATIENT)
Dept: INTERNAL MEDICINE CLINIC | Age: 44
End: 2024-10-15
Payer: COMMERCIAL

## 2024-10-15 VITALS
WEIGHT: 169 LBS | SYSTOLIC BLOOD PRESSURE: 135 MMHG | DIASTOLIC BLOOD PRESSURE: 89 MMHG | BODY MASS INDEX: 26.53 KG/M2 | HEART RATE: 80 BPM | RESPIRATION RATE: 18 BRPM | HEIGHT: 67 IN

## 2024-10-15 DIAGNOSIS — F11.20 SEVERE OPIOID USE DISORDER (HCC): Primary | ICD-10-CM

## 2024-10-15 DIAGNOSIS — F90.2 ATTENTION DEFICIT HYPERACTIVITY DISORDER (ADHD), COMBINED TYPE: ICD-10-CM

## 2024-10-15 PROCEDURE — 99214 OFFICE O/P EST MOD 30 MIN: CPT | Performed by: NURSE PRACTITIONER

## 2024-10-15 PROCEDURE — 80305 DRUG TEST PRSMV DIR OPT OBS: CPT | Performed by: NURSE PRACTITIONER

## 2024-10-15 RX ORDER — BUPRENORPHINE HYDROCHLORIDE AND NALOXONE HYDROCHLORIDE DIHYDRATE 2; .5 MG/1; MG/1
0.5 TABLET SUBLINGUAL DAILY
Qty: 14 TABLET | Refills: 0 | Status: CANCELLED | OUTPATIENT
Start: 2024-10-15 | End: 2024-11-12

## 2024-10-15 NOTE — PROGRESS NOTES
10/15/24   The patients primary care physician is Shemar Coronel Jr.,     Ana Beltran is a 44 y.o.  female who presents in office today for follow up medication assisted treatment, substance use disorder.     Pt denies any urges, triggers, or cravings.     UDS acceptable    Cardiology eval completed  Stress test completed on 10/11 without ischemia   Echo shows EF 55-60% with nl LVF, mild regurgitation    Hyperactive, hyperverbal, difficulty with focus, concentration and completing task. States she is always running.     Pertinent Drug History  Patient has had problems using pain pills- taking on average 10 Vicodin or percocets per day  Patient started using pain pills after an injury  A friend had given her a pain pill and she said it gave her energy  Patient uses it she usually would swallow them but at the end she was snorting them  Other drugs used: No  She weaned her off of Suboxone from November 2021 to February 2022  She got down to 1 mg and then got off in February      Social History     Socioeconomic History    Marital status:      Spouse name: Not on file    Number of children: Not on file    Years of education: Not on file    Highest education level: Not on file   Occupational History    Not on file   Tobacco Use    Smoking status: Every Day     Current packs/day: 0.25     Types: Cigarettes    Smokeless tobacco: Never   Substance and Sexual Activity    Alcohol use: Yes     Alcohol/week: 2.0 standard drinks of alcohol     Types: 2 Shots of liquor per week    Drug use: Yes     Types: Marijuana (Weed), Opiates      Comment: last used percocet and vicodin 2013    Sexual activity: Yes     Partners: Male   Other Topics Concern    Not on file   Social History Narrative    Not on file     Social Determinants of Health     Financial Resource Strain: Low Risk  (6/4/2024)    Overall Financial Resource Strain (CARDIA)     Difficulty of Paying Living Expenses: Not hard at all   Food

## 2024-12-10 ENCOUNTER — OFFICE VISIT (OUTPATIENT)
Dept: INTERNAL MEDICINE CLINIC | Age: 44
End: 2024-12-10

## 2024-12-10 VITALS
RESPIRATION RATE: 16 BRPM | WEIGHT: 165 LBS | DIASTOLIC BLOOD PRESSURE: 65 MMHG | BODY MASS INDEX: 25.84 KG/M2 | SYSTOLIC BLOOD PRESSURE: 138 MMHG

## 2024-12-10 DIAGNOSIS — F11.20 SEVERE OPIOID USE DISORDER (HCC): Primary | ICD-10-CM

## 2024-12-10 LAB
ALCOHOL URINE: ABNORMAL
AMPHETAMINE SCREEN URINE: POSITIVE
BARBITURATE SCREEN URINE: ABNORMAL
BENZODIAZEPINE SCREEN, URINE: ABNORMAL
BUPRENORPHINE URINE: ABNORMAL
COCAINE METABOLITE SCREEN URINE: ABNORMAL
FENTANYL SCREEN, URINE: ABNORMAL
GABAPENTIN SCREEN, URINE: ABNORMAL
MDMA, URINE: ABNORMAL
METHADONE SCREEN, URINE: ABNORMAL
METHAMPHETAMINE, URINE: ABNORMAL
OPIATE SCREEN URINE: ABNORMAL
OXYCODONE SCREEN URINE: ABNORMAL
PHENCYCLIDINE SCREEN URINE: ABNORMAL
PROPOXYPHENE SCREEN, URINE: ABNORMAL
SYNTHETIC CANNABINOIDS(K2) SCREEN, URINE: ABNORMAL
THC SCREEN, URINE: ABNORMAL
TRAMADOL SCREEN URINE: ABNORMAL
TRICYCLIC ANTIDEPRESSANTS, UR: ABNORMAL

## 2024-12-10 RX ORDER — BUPRENORPHINE HYDROCHLORIDE AND NALOXONE HYDROCHLORIDE DIHYDRATE 2; .5 MG/1; MG/1
0.5 TABLET SUBLINGUAL DAILY
Qty: 14 TABLET | Refills: 0 | Status: SHIPPED | OUTPATIENT
Start: 2024-12-10 | End: 2025-01-07

## 2024-12-10 RX ORDER — LISDEXAMFETAMINE DIMESYLATE 30 MG/1
30 CAPSULE ORAL DAILY
Qty: 30 CAPSULE | Refills: 0 | Status: SHIPPED | OUTPATIENT
Start: 2024-12-10 | End: 2025-01-09

## 2024-12-10 NOTE — PROGRESS NOTES
Verbal order per JIA LOPEZ CNP for urine drug screen. Positive for BUP, AMPH, THC. Verified results with HALI GRANDA RN.

## 2024-12-10 NOTE — PROGRESS NOTES
12/10/24   The patients primary care physician is Shemar Coronel Jr., DO    Ana Beltran is a 44 y.o.  female who presents in office today for follow up medication assisted treatment, substance use disorder.     Pt denies any urges, triggers, or cravings. Pt reports since starting on vyvanse she has not been taking suboxone daily. States she has experienced less urges and cravings, decreased anxiety, improved focus and concentration, and better sleep.  She is considering weaning of suboxone completely. After discussion, recommended to wait until after holidays as typically this time of year brings more stress. States she may try dosing every other day and discontinue use in January.     UDS acceptable      Pertinent Drug History  Patient has had problems using pain pills- taking on average 10 Vicodin or percocets per day  Patient started using pain pills after an injury  A friend had given her a pain pill and she said it gave her energy  Patient uses it she usually would swallow them but at the end she was snorting them  Other drugs used: No  She weaned her off of Suboxone from November 2021 to February 2022  She got down to 1 mg and then got off in February       Social History     Socioeconomic History    Marital status:      Spouse name: Not on file    Number of children: Not on file    Years of education: Not on file    Highest education level: Not on file   Occupational History    Not on file   Tobacco Use    Smoking status: Every Day     Current packs/day: 0.25     Types: Cigarettes    Smokeless tobacco: Never   Substance and Sexual Activity    Alcohol use: Yes     Alcohol/week: 2.0 standard drinks of alcohol     Types: 2 Shots of liquor per week    Drug use: Yes     Types: Marijuana (Weed), Opiates      Comment: last used percocet and vicodin 2013    Sexual activity: Yes     Partners: Male   Other Topics Concern    Not on file   Social History Narrative    Not on file     Social

## 2025-01-07 ENCOUNTER — SCHEDULED TELEPHONE ENCOUNTER (OUTPATIENT)
Dept: INTERNAL MEDICINE CLINIC | Age: 45
End: 2025-01-07

## 2025-01-07 DIAGNOSIS — F11.20 SEVERE OPIOID USE DISORDER (HCC): ICD-10-CM

## 2025-01-07 RX ORDER — BUPRENORPHINE HYDROCHLORIDE AND NALOXONE HYDROCHLORIDE DIHYDRATE 2; .5 MG/1; MG/1
0.5 TABLET SUBLINGUAL DAILY
Qty: 14 TABLET | Refills: 0 | Status: CANCELLED | OUTPATIENT
Start: 2025-01-07 | End: 2025-02-04

## 2025-01-07 RX ORDER — LISDEXAMFETAMINE DIMESYLATE 30 MG/1
30 CAPSULE ORAL DAILY
Qty: 30 CAPSULE | Refills: 0 | Status: SHIPPED | OUTPATIENT
Start: 2025-01-07 | End: 2025-02-06

## 2025-01-07 NOTE — PROGRESS NOTES
Total Time: minutes: 5-10 minutes     Ana Beltran was evaluated through a synchronous (real-time) audio encounter. Patient identification was verified at the start of the visit. She (or guardian if applicable) is aware that this is a billable service, which includes applicable co-pays. This visit was conducted with the patient's (and/or legal guardian's) verbal consent. She has not had a related appointment within my department in the past 7 days or scheduled within the next 24 hours.   The patient was located at Home: [address] [place] [id number].  The provider was located at Facility (Appt Dept): [address] [address].    Note: not billable if this call serves to triage the patient into an appointment for the relevant concern  Yes, I confirm.   Ana Beltran is a 44 y.o. female evaluated via telephone on 1/7/2025 for Addiction Problem  Visit for follow up medication assisted treatment, substance use disorder.      Pt denies any urges, triggers, or cravings.      She is weaning off suboxone. Taking only 1mg every 2-3 days. She denies symptoms of withdrawal. States she still has medication and denies need for refill.   Continues vyvanse for ADHD. This is working well. No additional needs or concerns voiced.   Assessment & Plan  Severe opioid use disorder (HCC)   Chronic, at goal (stable)    Orders:    lisdexamfetamine (VYVANSE) 30 MG capsule; Take 1 capsule by mouth daily for 30 days. Max Daily Amount: 30 mg    Return in about 4 weeks (around 2/4/2025), or 215.          Colette Bloom, APRN - CNP

## 2025-02-04 ENCOUNTER — OFFICE VISIT (OUTPATIENT)
Dept: INTERNAL MEDICINE CLINIC | Age: 45
End: 2025-02-04

## 2025-02-04 VITALS
HEART RATE: 95 BPM | BODY MASS INDEX: 25.53 KG/M2 | RESPIRATION RATE: 16 BRPM | WEIGHT: 163 LBS | DIASTOLIC BLOOD PRESSURE: 81 MMHG | SYSTOLIC BLOOD PRESSURE: 123 MMHG

## 2025-02-04 DIAGNOSIS — F11.20 SEVERE OPIOID USE DISORDER (HCC): Primary | ICD-10-CM

## 2025-02-04 DIAGNOSIS — F90.2 ATTENTION DEFICIT HYPERACTIVITY DISORDER (ADHD), COMBINED TYPE: ICD-10-CM

## 2025-02-04 RX ORDER — LISDEXAMFETAMINE DIMESYLATE 40 MG/1
40 CAPSULE ORAL DAILY
Qty: 30 CAPSULE | Refills: 0 | Status: SHIPPED | OUTPATIENT
Start: 2025-02-04 | End: 2025-03-06

## 2025-02-04 RX ORDER — LISDEXAMFETAMINE DIMESYLATE 30 MG/1
30 CAPSULE ORAL DAILY
Qty: 30 CAPSULE | Refills: 0 | Status: CANCELLED | OUTPATIENT
Start: 2025-02-04 | End: 2025-03-06

## 2025-02-04 RX ORDER — BUPRENORPHINE HYDROCHLORIDE AND NALOXONE HYDROCHLORIDE DIHYDRATE 2; .5 MG/1; MG/1
0.5 TABLET SUBLINGUAL DAILY
Qty: 14 TABLET | Refills: 0 | Status: SHIPPED | OUTPATIENT
Start: 2025-02-04 | End: 2025-03-04

## 2025-02-04 RX ORDER — LISDEXAMFETAMINE DIMESYLATE 40 MG/1
40 CAPSULE ORAL DAILY
Qty: 30 CAPSULE | Refills: 0 | Status: SHIPPED | OUTPATIENT
Start: 2025-04-05 | End: 2025-05-05

## 2025-02-04 RX ORDER — ESCITALOPRAM OXALATE 20 MG/1
20 TABLET ORAL DAILY
Qty: 30 TABLET | Refills: 3 | Status: SHIPPED | OUTPATIENT
Start: 2025-02-04

## 2025-02-04 RX ORDER — LISDEXAMFETAMINE DIMESYLATE 40 MG/1
40 CAPSULE ORAL DAILY
Qty: 30 CAPSULE | Refills: 0 | Status: SHIPPED | OUTPATIENT
Start: 2025-03-06 | End: 2025-04-05

## 2025-02-04 ASSESSMENT — PATIENT HEALTH QUESTIONNAIRE - PHQ9
6. FEELING BAD ABOUT YOURSELF - OR THAT YOU ARE A FAILURE OR HAVE LET YOURSELF OR YOUR FAMILY DOWN: NOT AT ALL
SUM OF ALL RESPONSES TO PHQ QUESTIONS 1-9: 0
SUM OF ALL RESPONSES TO PHQ QUESTIONS 1-9: 0
1. LITTLE INTEREST OR PLEASURE IN DOING THINGS: NOT AT ALL
9. THOUGHTS THAT YOU WOULD BE BETTER OFF DEAD, OR OF HURTING YOURSELF: NOT AT ALL
2. FEELING DOWN, DEPRESSED OR HOPELESS: NOT AT ALL
8. MOVING OR SPEAKING SO SLOWLY THAT OTHER PEOPLE COULD HAVE NOTICED. OR THE OPPOSITE, BEING SO FIGETY OR RESTLESS THAT YOU HAVE BEEN MOVING AROUND A LOT MORE THAN USUAL: NOT AT ALL
7. TROUBLE CONCENTRATING ON THINGS, SUCH AS READING THE NEWSPAPER OR WATCHING TELEVISION: NOT AT ALL
4. FEELING TIRED OR HAVING LITTLE ENERGY: NOT AT ALL
SUM OF ALL RESPONSES TO PHQ9 QUESTIONS 1 & 2: 0
SUM OF ALL RESPONSES TO PHQ QUESTIONS 1-9: 0
10. IF YOU CHECKED OFF ANY PROBLEMS, HOW DIFFICULT HAVE THESE PROBLEMS MADE IT FOR YOU TO DO YOUR WORK, TAKE CARE OF THINGS AT HOME, OR GET ALONG WITH OTHER PEOPLE: NOT DIFFICULT AT ALL
3. TROUBLE FALLING OR STAYING ASLEEP: NOT AT ALL
SUM OF ALL RESPONSES TO PHQ QUESTIONS 1-9: 0
5. POOR APPETITE OR OVEREATING: NOT AT ALL

## 2025-02-04 NOTE — PROGRESS NOTES
02/04/25   The patients primary care physician is Shemar Coronel Jr., DO    Ana Beltran is a 44 y.o.  female who presents in office today for follow up medication assisted treatment, substance use disorder.     Pt denies any urges, triggers, or cravings. Taking suboxone 1mg 1 tablet every other day. States this seems to be working very well for her. She has considered discontinuing suboxone all together but is nervous to do so. She does often forget a dose and denies experiencing any sx of withdrawal    UDS acceptable    Follow up in 2 months    Pt states vyvanse is working but still does not feel she is getting full benefit from medications. States she does focus and concentrate more easily but remains hyperactive. She is hyperverbal in office today but stays on task during conversation.     Feels she is sleeping well at night- no frequent wake ups. Sleeping better since starting vyvanse  - will increase to 40mg daily    Pertinent Drug History  Patient has had problems using pain pills- taking on average 10 Vicodin or percocets per day  Patient started using pain pills after an injury  She would typically swallow them but did start snorting them   Other drugs used: No  She weaned her off of Suboxone from November 2021 to February 2022    Social History     Socioeconomic History    Marital status:      Spouse name: Not on file    Number of children: Not on file    Years of education: Not on file    Highest education level: Not on file   Occupational History    Not on file   Tobacco Use    Smoking status: Every Day     Current packs/day: 0.25     Types: Cigarettes    Smokeless tobacco: Never   Substance and Sexual Activity    Alcohol use: Yes     Alcohol/week: 2.0 standard drinks of alcohol     Types: 2 Shots of liquor per week    Drug use: Yes     Types: Marijuana (Weed), Opiates      Comment: last used percocet and vicodin 2013    Sexual activity: Yes     Partners: Male   Other Topics

## 2025-02-04 NOTE — PROGRESS NOTES
Patient updated PHQ-9 depression screening.  Score:0  Patient denied all symptoms of depression.  Provider updated.  Paper copy scanned into chart.

## 2025-03-04 DIAGNOSIS — F11.20 SEVERE OPIOID USE DISORDER (HCC): ICD-10-CM

## 2025-03-04 RX ORDER — BUPRENORPHINE HYDROCHLORIDE AND NALOXONE HYDROCHLORIDE DIHYDRATE 2; .5 MG/1; MG/1
0.5 TABLET SUBLINGUAL DAILY
Qty: 14 TABLET | Refills: 0 | OUTPATIENT
Start: 2025-03-04 | End: 2025-04-01

## 2025-04-01 ENCOUNTER — OFFICE VISIT (OUTPATIENT)
Dept: INTERNAL MEDICINE CLINIC | Age: 45
End: 2025-04-01
Payer: COMMERCIAL

## 2025-04-01 VITALS
HEART RATE: 92 BPM | WEIGHT: 148 LBS | RESPIRATION RATE: 20 BRPM | HEIGHT: 67 IN | BODY MASS INDEX: 23.23 KG/M2 | SYSTOLIC BLOOD PRESSURE: 120 MMHG | DIASTOLIC BLOOD PRESSURE: 70 MMHG

## 2025-04-01 DIAGNOSIS — F90.2 ATTENTION DEFICIT HYPERACTIVITY DISORDER (ADHD), COMBINED TYPE: ICD-10-CM

## 2025-04-01 DIAGNOSIS — F11.20 SEVERE OPIOID USE DISORDER: Primary | ICD-10-CM

## 2025-04-01 PROCEDURE — 80305 DRUG TEST PRSMV DIR OPT OBS: CPT | Performed by: NURSE PRACTITIONER

## 2025-04-01 PROCEDURE — 99214 OFFICE O/P EST MOD 30 MIN: CPT | Performed by: NURSE PRACTITIONER

## 2025-04-01 RX ORDER — BUPRENORPHINE HYDROCHLORIDE AND NALOXONE HYDROCHLORIDE DIHYDRATE 2; .5 MG/1; MG/1
0.5 TABLET SUBLINGUAL DAILY
Qty: 14 TABLET | Refills: 0 | Status: SHIPPED | OUTPATIENT
Start: 2025-04-01 | End: 2025-04-29

## 2025-04-01 RX ORDER — LISDEXAMFETAMINE DIMESYLATE 40 MG/1
40 CAPSULE ORAL DAILY
Qty: 26 CAPSULE | Refills: 0 | Status: SHIPPED | OUTPATIENT
Start: 2025-04-01 | End: 2025-04-27

## 2025-04-01 NOTE — PROGRESS NOTES
04/01/25   The patients primary care physician is Shemar Cornoel Jr., DO    Ana Beltran is a 45 y.o.  female who presents in office today for follow up medication assisted treatment, substance use disorder.     Pt denies any urges, triggers, or cravings. She is taking only 1mg suboxone every other day. Agreeing to follow pt every 8 weeks in office.   States the only side effect she has when trying to discontinue completely is the restless legs at night.     UDS acceptable    Recent weight loss, taking semaglutide through clinic. States she is feeling very well. This is also helping to eliminate urges and cravings for alcohol and thc. States she has not drank any alcohol in a long time. Rarely smoking weed.     No new concerns or additional needs voiced.     Pertinent Drug History  Patient has had problems using pain pills- taking on average 10 Vicodin or percocets per day  Patient started using pain pills after an injury  She would typically swallow them but did start snorting them   Other drugs used: No  She weaned her off of Suboxone from November 2021 to February 2022    Social History     Socioeconomic History    Marital status:      Spouse name: Not on file    Number of children: Not on file    Years of education: Not on file    Highest education level: Not on file   Occupational History    Not on file   Tobacco Use    Smoking status: Every Day     Current packs/day: 0.25     Types: Cigarettes    Smokeless tobacco: Never   Substance and Sexual Activity    Alcohol use: Yes     Alcohol/week: 2.0 standard drinks of alcohol     Types: 2 Shots of liquor per week    Drug use: Yes     Types: Marijuana (Weed), Opiates      Comment: last used percocet and vicodin 2013    Sexual activity: Yes     Partners: Male   Other Topics Concern    Not on file   Social History Narrative    Not on file     Social Drivers of Health     Financial Resource Strain: Low Risk  (6/4/2024)    Overall Financial

## 2025-05-02 DIAGNOSIS — F90.2 ATTENTION DEFICIT HYPERACTIVITY DISORDER (ADHD), COMBINED TYPE: ICD-10-CM

## 2025-05-02 RX ORDER — LISDEXAMFETAMINE DIMESYLATE 40 MG/1
40 CAPSULE ORAL DAILY
Qty: 26 CAPSULE | Refills: 0 | Status: SHIPPED | OUTPATIENT
Start: 2025-05-02 | End: 2025-05-28

## 2025-05-05 DIAGNOSIS — F11.21 OPIOID DEPENDENCE IN REMISSION (HCC): ICD-10-CM

## 2025-05-05 DIAGNOSIS — F11.20 SEVERE OPIOID USE DISORDER (HCC): ICD-10-CM

## 2025-05-05 RX ORDER — BUPRENORPHINE HYDROCHLORIDE AND NALOXONE HYDROCHLORIDE DIHYDRATE 2; .5 MG/1; MG/1
0.5 TABLET SUBLINGUAL DAILY
Qty: 11 TABLET | Refills: 0 | OUTPATIENT
Start: 2025-05-05 | End: 2025-05-27

## 2025-05-05 RX ORDER — ESCITALOPRAM OXALATE 20 MG/1
20 TABLET ORAL DAILY
Qty: 30 TABLET | Refills: 3 | Status: SHIPPED | OUTPATIENT
Start: 2025-05-05

## 2025-05-05 NOTE — TELEPHONE ENCOUNTER
Patient called needing refill to get her until her appointment on 5/27/25. Medication called into pharmacy. Patient got her vyvanse just not her suboxone and lexapro.

## 2025-05-27 ENCOUNTER — OFFICE VISIT (OUTPATIENT)
Dept: INTERNAL MEDICINE CLINIC | Age: 45
End: 2025-05-27
Payer: COMMERCIAL

## 2025-05-27 VITALS
RESPIRATION RATE: 16 BRPM | HEART RATE: 104 BPM | SYSTOLIC BLOOD PRESSURE: 149 MMHG | DIASTOLIC BLOOD PRESSURE: 64 MMHG | BODY MASS INDEX: 21.46 KG/M2 | WEIGHT: 137 LBS

## 2025-05-27 DIAGNOSIS — F90.2 ATTENTION DEFICIT HYPERACTIVITY DISORDER (ADHD), COMBINED TYPE: ICD-10-CM

## 2025-05-27 DIAGNOSIS — F11.20 SEVERE OPIOID USE DISORDER (HCC): Primary | ICD-10-CM

## 2025-05-27 PROCEDURE — 80305 DRUG TEST PRSMV DIR OPT OBS: CPT | Performed by: NURSE PRACTITIONER

## 2025-05-27 PROCEDURE — 99214 OFFICE O/P EST MOD 30 MIN: CPT | Performed by: NURSE PRACTITIONER

## 2025-05-27 RX ORDER — BUPRENORPHINE HYDROCHLORIDE AND NALOXONE HYDROCHLORIDE DIHYDRATE 2; .5 MG/1; MG/1
0.5 TABLET SUBLINGUAL DAILY
Qty: 14 TABLET | Refills: 0 | Status: SHIPPED | OUTPATIENT
Start: 2025-05-27 | End: 2025-06-24

## 2025-05-27 RX ORDER — LISDEXAMFETAMINE DIMESYLATE 40 MG/1
40 CAPSULE ORAL DAILY
Qty: 26 CAPSULE | Refills: 0 | Status: SHIPPED | OUTPATIENT
Start: 2025-05-27 | End: 2025-06-24

## 2025-05-27 SDOH — ECONOMIC STABILITY: FOOD INSECURITY: WITHIN THE PAST 12 MONTHS, YOU WORRIED THAT YOUR FOOD WOULD RUN OUT BEFORE YOU GOT MONEY TO BUY MORE.: NEVER TRUE

## 2025-05-27 SDOH — ECONOMIC STABILITY: FOOD INSECURITY: WITHIN THE PAST 12 MONTHS, THE FOOD YOU BOUGHT JUST DIDN'T LAST AND YOU DIDN'T HAVE MONEY TO GET MORE.: NEVER TRUE

## 2025-05-27 NOTE — PROGRESS NOTES
05/27/25   The patients primary care physician is Shemar Coronel Jr., DO    Ana Beltran is a 45 y.o.  female who presents in office today for follow up medication assisted treatment, substance use disorder.     Pt denies any urges, triggers, or cravings.     UDS Positive for THC, BUP, AMPH.      Reports feeling foggy at times, more forgetful than usual, decreased sex drive, difficulty sleeping. Denies having hot flashes. Feels she may be having sx of menopause.   Taking medications as prescribed. No reports of headache or dizziness. No weakness.   She would like hormone levels- referred to HEAVENLY Harris CNP for further evaluation      Pertinent Drug History  Patient has had problems using pain pills- taking on average 10 Vicodin or percocets per day  Patient started using pain pills after an injury  She would typically swallow them but did start snorting them   Other drugs used: No  She weaned her off of Suboxone from November 2021 to February 2022         Social History     Socioeconomic History    Marital status:      Spouse name: Not on file    Number of children: Not on file    Years of education: Not on file    Highest education level: Not on file   Occupational History    Not on file   Tobacco Use    Smoking status: Every Day     Current packs/day: 0.25     Types: Cigarettes    Smokeless tobacco: Never   Substance and Sexual Activity    Alcohol use: Yes     Alcohol/week: 2.0 standard drinks of alcohol     Types: 2 Shots of liquor per week    Drug use: Yes     Types: Marijuana (Weed), Opiates      Comment: last used percocet and vicodin 2013    Sexual activity: Yes     Partners: Male   Other Topics Concern    Not on file   Social History Narrative    Not on file     Social Drivers of Health     Financial Resource Strain: Low Risk  (6/4/2024)    Overall Financial Resource Strain (CARDIA)     Difficulty of Paying Living Expenses: Not hard at all   Food Insecurity: No Food Insecurity (5/27/2025)

## 2025-07-01 DIAGNOSIS — F90.2 ATTENTION DEFICIT HYPERACTIVITY DISORDER (ADHD), COMBINED TYPE: ICD-10-CM

## 2025-07-01 DIAGNOSIS — F11.20 SEVERE OPIOID USE DISORDER (HCC): ICD-10-CM

## 2025-07-01 RX ORDER — BUPRENORPHINE HYDROCHLORIDE AND NALOXONE HYDROCHLORIDE DIHYDRATE 2; .5 MG/1; MG/1
0.5 TABLET SUBLINGUAL DAILY
Qty: 14 TABLET | Refills: 0 | Status: SHIPPED | OUTPATIENT
Start: 2025-07-01 | End: 2025-07-29

## 2025-07-01 RX ORDER — LISDEXAMFETAMINE DIMESYLATE 40 MG/1
40 CAPSULE ORAL DAILY
Qty: 30 CAPSULE | Refills: 0 | Status: SHIPPED | OUTPATIENT
Start: 2025-07-01 | End: 2025-07-29

## 2025-07-29 ENCOUNTER — OFFICE VISIT (OUTPATIENT)
Age: 45
End: 2025-07-29
Payer: COMMERCIAL

## 2025-07-29 VITALS
HEART RATE: 101 BPM | BODY MASS INDEX: 21.27 KG/M2 | SYSTOLIC BLOOD PRESSURE: 130 MMHG | RESPIRATION RATE: 16 BRPM | DIASTOLIC BLOOD PRESSURE: 74 MMHG | WEIGHT: 135.8 LBS

## 2025-07-29 DIAGNOSIS — F11.20 SEVERE OPIOID USE DISORDER (HCC): Primary | ICD-10-CM

## 2025-07-29 DIAGNOSIS — F90.2 ATTENTION DEFICIT HYPERACTIVITY DISORDER (ADHD), COMBINED TYPE: ICD-10-CM

## 2025-07-29 DIAGNOSIS — R53.83 OTHER FATIGUE: ICD-10-CM

## 2025-07-29 PROCEDURE — 99214 OFFICE O/P EST MOD 30 MIN: CPT | Performed by: NURSE PRACTITIONER

## 2025-07-29 PROCEDURE — 80305 DRUG TEST PRSMV DIR OPT OBS: CPT | Performed by: NURSE PRACTITIONER

## 2025-07-29 RX ORDER — LISDEXAMFETAMINE DIMESYLATE 40 MG/1
40 CAPSULE ORAL DAILY
Qty: 30 CAPSULE | Refills: 0 | Status: SHIPPED | OUTPATIENT
Start: 2025-08-26 | End: 2025-09-25

## 2025-07-29 RX ORDER — BUPRENORPHINE HYDROCHLORIDE AND NALOXONE HYDROCHLORIDE DIHYDRATE 2; .5 MG/1; MG/1
0.5 TABLET SUBLINGUAL DAILY
Qty: 14 TABLET | Refills: 0 | Status: SHIPPED | OUTPATIENT
Start: 2025-08-26 | End: 2025-09-23

## 2025-07-29 RX ORDER — LISDEXAMFETAMINE DIMESYLATE 40 MG/1
40 CAPSULE ORAL DAILY
Qty: 26 CAPSULE | Refills: 0 | Status: SHIPPED | OUTPATIENT
Start: 2025-07-29 | End: 2025-07-30 | Stop reason: SDUPTHER

## 2025-07-29 RX ORDER — BUPRENORPHINE HYDROCHLORIDE AND NALOXONE HYDROCHLORIDE DIHYDRATE 2; .5 MG/1; MG/1
0.5 TABLET SUBLINGUAL DAILY
Qty: 14 TABLET | Refills: 0 | Status: SHIPPED | OUTPATIENT
Start: 2025-07-29 | End: 2025-08-26

## 2025-07-29 NOTE — PROGRESS NOTES
Pt came in for f/u. Poct uds performed. Pos for AMP and BUP   
(around 9/23/2025).    Patient given educational materials - see patient instructions.  Discussed use, benefit, and side effects of prescribed medications.  All patient questions answered.  Pt voiced understanding.  Reviewed health maintenance.

## 2025-07-30 DIAGNOSIS — F90.2 ATTENTION DEFICIT HYPERACTIVITY DISORDER (ADHD), COMBINED TYPE: ICD-10-CM

## 2025-07-30 RX ORDER — LISDEXAMFETAMINE DIMESYLATE 40 MG/1
40 CAPSULE ORAL DAILY
Qty: 28 CAPSULE | Refills: 0 | Status: SHIPPED | OUTPATIENT
Start: 2025-07-30 | End: 2025-08-27

## 2025-07-30 NOTE — TELEPHONE ENCOUNTER
The pharmacy called and stated the Vyvanse script was wrote for 26 caps but the signature stated for 28 days. The pharmacy asked if Colette Bloom CNP could send a script for 28 caps

## 2025-08-30 DIAGNOSIS — F90.2 ATTENTION DEFICIT HYPERACTIVITY DISORDER (ADHD), COMBINED TYPE: ICD-10-CM

## 2025-08-30 RX ORDER — ESCITALOPRAM OXALATE 20 MG/1
20 TABLET ORAL DAILY
Qty: 30 TABLET | Refills: 3 | Status: SHIPPED | OUTPATIENT
Start: 2025-08-30

## 2025-08-30 RX ORDER — LISDEXAMFETAMINE DIMESYLATE 40 MG/1
40 CAPSULE ORAL DAILY
Qty: 30 CAPSULE | Refills: 0 | Status: SHIPPED | OUTPATIENT
Start: 2025-08-30 | End: 2025-09-29

## 2025-09-02 ENCOUNTER — TELEPHONE (OUTPATIENT)
Age: 45
End: 2025-09-02